# Patient Record
Sex: FEMALE | Race: WHITE | NOT HISPANIC OR LATINO
[De-identification: names, ages, dates, MRNs, and addresses within clinical notes are randomized per-mention and may not be internally consistent; named-entity substitution may affect disease eponyms.]

---

## 2017-09-11 ENCOUNTER — TRANSCRIPTION ENCOUNTER (OUTPATIENT)
Age: 82
End: 2017-09-11

## 2018-02-10 ENCOUNTER — TRANSCRIPTION ENCOUNTER (OUTPATIENT)
Age: 83
End: 2018-02-10

## 2018-03-12 ENCOUNTER — TRANSCRIPTION ENCOUNTER (OUTPATIENT)
Age: 83
End: 2018-03-12

## 2019-06-24 ENCOUNTER — INPATIENT (INPATIENT)
Facility: HOSPITAL | Age: 84
LOS: 3 days | Discharge: NURSING, REHAB OR LONG-TERM CARE | DRG: 469 | End: 2019-06-28
Attending: HOSPITALIST | Admitting: HOSPITALIST
Payer: MEDICARE

## 2019-06-24 ENCOUNTER — TRANSCRIPTION ENCOUNTER (OUTPATIENT)
Age: 84
End: 2019-06-24

## 2019-06-24 VITALS
WEIGHT: 164.91 LBS | SYSTOLIC BLOOD PRESSURE: 159 MMHG | HEART RATE: 72 BPM | RESPIRATION RATE: 20 BRPM | HEIGHT: 66 IN | DIASTOLIC BLOOD PRESSURE: 78 MMHG | OXYGEN SATURATION: 99 % | TEMPERATURE: 98 F

## 2019-06-24 DIAGNOSIS — S72.002A FRACTURE OF UNSPECIFIED PART OF NECK OF LEFT FEMUR, INITIAL ENCOUNTER FOR CLOSED FRACTURE: ICD-10-CM

## 2019-06-24 LAB
ALBUMIN SERPL ELPH-MCNC: 3.5 G/DL — SIGNIFICANT CHANGE UP (ref 3.3–5)
ALP SERPL-CCNC: 80 U/L — SIGNIFICANT CHANGE UP (ref 30–120)
ALT FLD-CCNC: 35 U/L DA — SIGNIFICANT CHANGE UP (ref 10–60)
ANION GAP SERPL CALC-SCNC: 6 MMOL/L — SIGNIFICANT CHANGE UP (ref 5–17)
APPEARANCE UR: CLEAR — SIGNIFICANT CHANGE UP
APTT BLD: 28.6 SEC — SIGNIFICANT CHANGE UP (ref 28.5–37)
APTT BLD: 29.8 SEC — SIGNIFICANT CHANGE UP (ref 28.5–37)
AST SERPL-CCNC: 33 U/L — SIGNIFICANT CHANGE UP (ref 10–40)
BASOPHILS # BLD AUTO: 0.02 K/UL — SIGNIFICANT CHANGE UP (ref 0–0.2)
BASOPHILS NFR BLD AUTO: 0.4 % — SIGNIFICANT CHANGE UP (ref 0–2)
BILIRUB SERPL-MCNC: 0.2 MG/DL — SIGNIFICANT CHANGE UP (ref 0.2–1.2)
BILIRUB UR-MCNC: NEGATIVE — SIGNIFICANT CHANGE UP
BLD GP AB SCN SERPL QL: SIGNIFICANT CHANGE UP
BUN SERPL-MCNC: 31 MG/DL — HIGH (ref 7–23)
CALCIUM SERPL-MCNC: 8.9 MG/DL — SIGNIFICANT CHANGE UP (ref 8.4–10.5)
CHLORIDE SERPL-SCNC: 103 MMOL/L — SIGNIFICANT CHANGE UP (ref 96–108)
CO2 SERPL-SCNC: 30 MMOL/L — SIGNIFICANT CHANGE UP (ref 22–31)
COLOR SPEC: SIGNIFICANT CHANGE UP
CREAT SERPL-MCNC: 0.87 MG/DL — SIGNIFICANT CHANGE UP (ref 0.5–1.3)
DIFF PNL FLD: NEGATIVE — SIGNIFICANT CHANGE UP
EOSINOPHIL # BLD AUTO: 0.21 K/UL — SIGNIFICANT CHANGE UP (ref 0–0.5)
EOSINOPHIL NFR BLD AUTO: 3.9 % — SIGNIFICANT CHANGE UP (ref 0–6)
GLUCOSE SERPL-MCNC: 108 MG/DL — HIGH (ref 70–99)
GLUCOSE UR QL: 50 MG/DL
HCT VFR BLD CALC: 34.6 % — SIGNIFICANT CHANGE UP (ref 34.5–45)
HGB BLD-MCNC: 11.3 G/DL — LOW (ref 11.5–15.5)
IMM GRANULOCYTES NFR BLD AUTO: 0.6 % — SIGNIFICANT CHANGE UP (ref 0–1.5)
INR BLD: 0.98 RATIO — SIGNIFICANT CHANGE UP (ref 0.88–1.16)
KETONES UR-MCNC: NEGATIVE — SIGNIFICANT CHANGE UP
LEUKOCYTE ESTERASE UR-ACNC: NEGATIVE — SIGNIFICANT CHANGE UP
LYMPHOCYTES # BLD AUTO: 1.04 K/UL — SIGNIFICANT CHANGE UP (ref 1–3.3)
LYMPHOCYTES # BLD AUTO: 19.5 % — SIGNIFICANT CHANGE UP (ref 13–44)
MCHC RBC-ENTMCNC: 29.6 PG — SIGNIFICANT CHANGE UP (ref 27–34)
MCHC RBC-ENTMCNC: 32.7 GM/DL — SIGNIFICANT CHANGE UP (ref 32–36)
MCV RBC AUTO: 90.6 FL — SIGNIFICANT CHANGE UP (ref 80–100)
MONOCYTES # BLD AUTO: 0.51 K/UL — SIGNIFICANT CHANGE UP (ref 0–0.9)
MONOCYTES NFR BLD AUTO: 9.6 % — SIGNIFICANT CHANGE UP (ref 2–14)
NEUTROPHILS # BLD AUTO: 3.51 K/UL — SIGNIFICANT CHANGE UP (ref 1.8–7.4)
NEUTROPHILS NFR BLD AUTO: 66 % — SIGNIFICANT CHANGE UP (ref 43–77)
NITRITE UR-MCNC: NEGATIVE — SIGNIFICANT CHANGE UP
NRBC # BLD: 0 /100 WBCS — SIGNIFICANT CHANGE UP (ref 0–0)
PH UR: 7 — SIGNIFICANT CHANGE UP (ref 5–8)
PLATELET # BLD AUTO: 221 K/UL — SIGNIFICANT CHANGE UP (ref 150–400)
POTASSIUM SERPL-MCNC: 3.5 MMOL/L — SIGNIFICANT CHANGE UP (ref 3.5–5.3)
POTASSIUM SERPL-SCNC: 3.5 MMOL/L — SIGNIFICANT CHANGE UP (ref 3.5–5.3)
PROT SERPL-MCNC: 6.7 G/DL — SIGNIFICANT CHANGE UP (ref 6–8.3)
PROT UR-MCNC: NEGATIVE MG/DL — SIGNIFICANT CHANGE UP
PROTHROM AB SERPL-ACNC: 10.7 SEC — SIGNIFICANT CHANGE UP (ref 10–12.9)
RBC # BLD: 3.82 M/UL — SIGNIFICANT CHANGE UP (ref 3.8–5.2)
RBC # FLD: 12.8 % — SIGNIFICANT CHANGE UP (ref 10.3–14.5)
SODIUM SERPL-SCNC: 139 MMOL/L — SIGNIFICANT CHANGE UP (ref 135–145)
SP GR SPEC: 1 — LOW (ref 1.01–1.02)
TSH SERPL-MCNC: 4.12 UIU/ML — SIGNIFICANT CHANGE UP (ref 0.27–4.2)
UROBILINOGEN FLD QL: NEGATIVE MG/DL — SIGNIFICANT CHANGE UP
WBC # BLD: 5.32 K/UL — SIGNIFICANT CHANGE UP (ref 3.8–10.5)
WBC # FLD AUTO: 5.32 K/UL — SIGNIFICANT CHANGE UP (ref 3.8–10.5)

## 2019-06-24 PROCEDURE — 73502 X-RAY EXAM HIP UNI 2-3 VIEWS: CPT | Mod: 26,LT

## 2019-06-24 PROCEDURE — 12345: CPT | Mod: NC

## 2019-06-24 PROCEDURE — 99285 EMERGENCY DEPT VISIT HI MDM: CPT

## 2019-06-24 PROCEDURE — 99222 1ST HOSP IP/OBS MODERATE 55: CPT | Mod: 57

## 2019-06-24 PROCEDURE — 93010 ELECTROCARDIOGRAM REPORT: CPT

## 2019-06-24 PROCEDURE — 71045 X-RAY EXAM CHEST 1 VIEW: CPT | Mod: 26

## 2019-06-24 PROCEDURE — 99223 1ST HOSP IP/OBS HIGH 75: CPT | Mod: AI

## 2019-06-24 RX ORDER — NIFEDIPINE 30 MG
30 TABLET, EXTENDED RELEASE 24 HR ORAL DAILY
Refills: 0 | Status: DISCONTINUED | OUTPATIENT
Start: 2019-06-25 | End: 2019-06-25

## 2019-06-24 RX ORDER — MORPHINE SULFATE 50 MG/1
2 CAPSULE, EXTENDED RELEASE ORAL EVERY 4 HOURS
Refills: 0 | Status: DISCONTINUED | OUTPATIENT
Start: 2019-06-24 | End: 2019-06-25

## 2019-06-24 RX ORDER — ASPIRIN/CALCIUM CARB/MAGNESIUM 324 MG
81 TABLET ORAL DAILY
Refills: 0 | Status: DISCONTINUED | OUTPATIENT
Start: 2019-06-24 | End: 2019-06-25

## 2019-06-24 RX ORDER — HEPARIN SODIUM 5000 [USP'U]/ML
5000 INJECTION INTRAVENOUS; SUBCUTANEOUS EVERY 8 HOURS
Refills: 0 | Status: COMPLETED | OUTPATIENT
Start: 2019-06-24 | End: 2019-06-24

## 2019-06-24 RX ORDER — SODIUM CHLORIDE 9 MG/ML
1000 INJECTION, SOLUTION INTRAVENOUS
Refills: 0 | Status: DISCONTINUED | OUTPATIENT
Start: 2019-06-24 | End: 2019-06-25

## 2019-06-24 RX ORDER — SERTRALINE 25 MG/1
1 TABLET, FILM COATED ORAL
Qty: 0 | Refills: 0 | DISCHARGE

## 2019-06-24 RX ORDER — TRANEXAMIC ACID 100 MG/ML
1000 INJECTION, SOLUTION INTRAVENOUS ONCE
Refills: 0 | Status: COMPLETED | OUTPATIENT
Start: 2019-06-24 | End: 2019-06-24

## 2019-06-24 RX ORDER — NIFEDIPINE 30 MG
1 TABLET, EXTENDED RELEASE 24 HR ORAL
Qty: 0 | Refills: 0 | DISCHARGE

## 2019-06-24 RX ORDER — ATORVASTATIN CALCIUM 80 MG/1
80 TABLET, FILM COATED ORAL AT BEDTIME
Refills: 0 | Status: DISCONTINUED | OUTPATIENT
Start: 2019-06-24 | End: 2019-06-25

## 2019-06-24 RX ORDER — LISINOPRIL 2.5 MG/1
20 TABLET ORAL DAILY
Refills: 0 | Status: DISCONTINUED | OUTPATIENT
Start: 2019-06-25 | End: 2019-06-25

## 2019-06-24 RX ORDER — SERTRALINE 25 MG/1
100 TABLET, FILM COATED ORAL DAILY
Refills: 0 | Status: DISCONTINUED | OUTPATIENT
Start: 2019-06-24 | End: 2019-06-25

## 2019-06-24 RX ORDER — ATENOLOL 25 MG/1
50 TABLET ORAL DAILY
Refills: 0 | Status: DISCONTINUED | OUTPATIENT
Start: 2019-06-25 | End: 2019-06-25

## 2019-06-24 RX ORDER — ATORVASTATIN CALCIUM 80 MG/1
1 TABLET, FILM COATED ORAL
Qty: 0 | Refills: 0 | DISCHARGE

## 2019-06-24 RX ORDER — MORPHINE SULFATE 50 MG/1
2 CAPSULE, EXTENDED RELEASE ORAL ONCE
Refills: 0 | Status: DISCONTINUED | OUTPATIENT
Start: 2019-06-24 | End: 2019-06-24

## 2019-06-24 RX ORDER — CEFAZOLIN SODIUM 1 G
2000 VIAL (EA) INJECTION ONCE
Refills: 0 | Status: COMPLETED | OUTPATIENT
Start: 2019-06-24 | End: 2019-06-24

## 2019-06-24 RX ORDER — ONDANSETRON 8 MG/1
4 TABLET, FILM COATED ORAL EVERY 8 HOURS
Refills: 0 | Status: DISCONTINUED | OUTPATIENT
Start: 2019-06-24 | End: 2019-06-25

## 2019-06-24 RX ADMIN — MORPHINE SULFATE 2 MILLIGRAM(S): 50 CAPSULE, EXTENDED RELEASE ORAL at 09:45

## 2019-06-24 RX ADMIN — MORPHINE SULFATE 2 MILLIGRAM(S): 50 CAPSULE, EXTENDED RELEASE ORAL at 00:56

## 2019-06-24 RX ADMIN — MORPHINE SULFATE 2 MILLIGRAM(S): 50 CAPSULE, EXTENDED RELEASE ORAL at 13:51

## 2019-06-24 RX ADMIN — MORPHINE SULFATE 2 MILLIGRAM(S): 50 CAPSULE, EXTENDED RELEASE ORAL at 13:21

## 2019-06-24 RX ADMIN — MORPHINE SULFATE 2 MILLIGRAM(S): 50 CAPSULE, EXTENDED RELEASE ORAL at 01:06

## 2019-06-24 RX ADMIN — HEPARIN SODIUM 5000 UNIT(S): 5000 INJECTION INTRAVENOUS; SUBCUTANEOUS at 22:10

## 2019-06-24 RX ADMIN — HEPARIN SODIUM 5000 UNIT(S): 5000 INJECTION INTRAVENOUS; SUBCUTANEOUS at 15:25

## 2019-06-24 RX ADMIN — Medication 81 MILLIGRAM(S): at 12:40

## 2019-06-24 RX ADMIN — MORPHINE SULFATE 2 MILLIGRAM(S): 50 CAPSULE, EXTENDED RELEASE ORAL at 09:15

## 2019-06-24 RX ADMIN — ONDANSETRON 4 MILLIGRAM(S): 8 TABLET, FILM COATED ORAL at 12:40

## 2019-06-24 RX ADMIN — ATORVASTATIN CALCIUM 80 MILLIGRAM(S): 80 TABLET, FILM COATED ORAL at 22:10

## 2019-06-24 RX ADMIN — SERTRALINE 100 MILLIGRAM(S): 25 TABLET, FILM COATED ORAL at 15:19

## 2019-06-24 RX ADMIN — SODIUM CHLORIDE 100 MILLILITER(S): 9 INJECTION, SOLUTION INTRAVENOUS at 15:19

## 2019-06-24 NOTE — PROGRESS NOTE ADULT - SUBJECTIVE AND OBJECTIVE BOX
THEE PAREDES                                                               [id number]                                                      Allergies---No Known Allergies        Pt is a 89y year old Female s/p left hip fracture following a fall yesterday.   Pt was home when she accidentally fell.   Pt's family states that she does ambulate, gingerly with a walker.   Pain is 6/10.   Currently she is NPO, but will be given food, as surgery is planned for tomorrow The patient is A&O x 3, resting comfortably in bed.   Denies any chest pain / shortness of breath / dyspnea / nausea / vomiting / headaches or light headed ness.       Vital Signs Last 24 Hrs  T(F): 98.4 (06-24-19 @ 07:05), Max: 98.4 (06-24-19 @ 07:05)  HR: 70 (06-24-19 @ 07:05)  BP: 161/64 (06-24-19 @ 07:05)  RR: 18 (06-24-19 @ 07:05)  SpO2: 92% (06-24-19 @ 07:05)    I&O's Detail    23 Jun 2019 07:01  -  24 Jun 2019 07:00  --------------------------------------------------------  IN:  Total IN: 0 mL    OUT:    Voided: 850 mL  Total OUT: 850 mL    Total NET: -850 mL        PE:   Left Lower Extremity:   Skin is clean dry and intact.   No hematoma or ecchymosis noted.   Pain and tenderness radiation into the left groin.   Neurovascularly intact.   No gross evidence of motor or sensory deficits.   EHL/FHL/TA intact.   +2 DP/PT pulses.   Toes are pink and mobile.   Negative calf tenderness.   No evidence of impending compartment syndrome.   PAS on.                             11.3   5.32  )--------------( 221                          06-24-19 @ 01:08               34.6         139   |  103  |  31  -----------------------------<  108                  06-24-19 @ 01:08  3.5    |  30    |  0.87        Ca    8.9            TPro  6.7  /  Alb  3.5  /  TBili  0.2  /  DBili  ---  /  AST  33  /  ALT  35  /  AlkPhos  80         A:   Pt is a 89y year old Female with a fracture left hip        Plan:    - Follow up with Medicine and Cardiac for clearance   - Dietary consult for food intake   - Bedrest.   NWB left lower extremity   - DVT ppx = PAS +  [id number]   - Pain control    - Incentive spirometry   - NPO after midnight   - For possible Cemented Left hip Hunter on Tuesday with Dr. Jovanna Burrows RPA-C THEE PAREDES                                                               [id number]                                                      Allergies---No Known Allergies        Pt is a 89y year old Female s/p left hip fracture following a fall yesterday.   Pt was home when she accidentally fell.   Pt's family states that she does ambulate, gingerly with a cane.   Pain is 6/10.   Currently she is NPO, but will be given food, as surgery is planned for tomorrow The patient is A&O x 3, resting comfortably in bed.   Denies any chest pain / shortness of breath / dyspnea / nausea / vomiting / headaches or light headed ness.       Vital Signs Last 24 Hrs  T(F): 98.4 (06-24-19 @ 07:05), Max: 98.4 (06-24-19 @ 07:05)  HR: 70 (06-24-19 @ 07:05)  BP: 161/64 (06-24-19 @ 07:05)  RR: 18 (06-24-19 @ 07:05)  SpO2: 92% (06-24-19 @ 07:05)    I&O's Detail    23 Jun 2019 07:01  -  24 Jun 2019 07:00  --------------------------------------------------------  IN:  Total IN: 0 mL    OUT:    Voided: 850 mL  Total OUT: 850 mL    Total NET: -850 mL        PE:   Left Lower Extremity:   Skin is clean dry and intact.   No hematoma or ecchymosis noted.   Pain and tenderness radiation into the left groin.   Neurovascularly intact.   No gross evidence of motor or sensory deficits.   EHL/FHL/TA intact.   +2 DP/PT pulses.   Toes are pink and mobile.   Negative calf tenderness.   No evidence of impending compartment syndrome.   PAS on.                             11.3   5.32  )--------------( 221                          06-24-19 @ 01:08               34.6         139   |  103  |  31  -----------------------------<  108                  06-24-19 @ 01:08  3.5    |  30    |  0.87        Ca    8.9            TPro  6.7  /  Alb  3.5  /  TBili  0.2  /  DBili  ---  /  AST  33  /  ALT  35  /  AlkPhos  80         A:   Pt is a 89y year old Female with a fracture left hip        Plan:    - Follow up with Medicine and Cardiac for clearance   - Dietary consult for food intake   - Bedrest.   NWB left lower extremity   - DVT ppx = PAS +  [id number]   - Pain control    - Incentive spirometry   - NPO after midnight   - For possible Cemented Left hip Hunter on Tuesday with Dr. Jovanna Burrows RPA-C

## 2019-06-24 NOTE — CONSULT NOTE ADULT - SUBJECTIVE AND OBJECTIVE BOX
History of Present Illness: The patient is a 89 year old female with a history of HTN, OA who presents with hip fracture. She went to go to the bathroom and had a mechanical fall. No dizziness or syncope. She denies chest pain or shortness of breath. No exertional symptoms. No prior cardiac testing.    Past Medical/Surgical History:  HTN, OA    Medications:  Home Medications:  atenolol 50 mg oral tablet: 1 tab(s) orally once a day (24 Jun 2019 01:46)  Excedrin oral tablet: 81 milligram(s) orally once a day (24 Jun 2019 01:46)  Lipitor 80 mg oral tablet: 1 tab(s) orally once a day (24 Jun 2019 01:46)  lisinopril 20 mg oral tablet: 20 milligram(s) orally 2 times a day (24 Jun 2019 01:46)  Mobic 15 mg oral tablet: 1 tab(s) orally once a day (24 Jun 2019 01:46)  NIFEdipine 30 mg oral tablet, extended release: 1 tab(s) orally once a day (24 Jun 2019 01:46)  Zoloft 100 mg oral tablet: 1 tab(s) orally once a day (24 Jun 2019 01:46)      Family History: Non-contributory family history of premature cardiovascular atherosclerotic disease    Social History: No tobacco, alcohol or drug use    Review of Systems:  General: No fevers, chills, weight loss or gain  Skin: No rashes, color changes  Cardiovascular: No chest pain, orthopnea  Respiratory: No shortness of breath, cough  Gastrointestinal: No nausea, abdominal pain  Genitourinary: No incontinence, pain with urination  Musculoskeletal: No pain, swelling, decreased range of motion  Neurological: No headache, weakness  Psychiatric: No depression, anxiety  Endocrine: No weight loss or gain, increased thirst  All other systems are comprehensively negative.    Physical Exam:  Vitals:        Vital Signs Last 24 Hrs  T(C): 36.9 (24 Jun 2019 07:05), Max: 36.9 (24 Jun 2019 07:05)  T(F): 98.4 (24 Jun 2019 07:05), Max: 98.4 (24 Jun 2019 07:05)  HR: 70 (24 Jun 2019 07:05) (70 - 72)  BP: 161/64 (24 Jun 2019 07:05) (159/78 - 161/64)  BP(mean): --  RR: 18 (24 Jun 2019 07:05) (18 - 20)  SpO2: 92% (24 Jun 2019 07:05) (92% - 99%)  General: NAD  HEENT: MMM  Neck: No JVD, no carotid bruit  Lungs: CTAB  CV: RRR, nl S1/S2, no M/R/G  Abdomen: S/NT/ND, +BS  Extremities: No LE edema, no cyanosis  Neuro: AAOx3, non-focal  Skin: No rash    Labs:                        11.3   5.32  )-----------( 221      ( 24 Jun 2019 01:08 )             34.6     06-24    139  |  103  |  31<H>  ----------------------------<  108<H>  3.5   |  30  |  0.87    Ca    8.9      24 Jun 2019 01:08    TPro  6.7  /  Alb  3.5  /  TBili  0.2  /  DBili  x   /  AST  33  /  ALT  35  /  AlkPhos  80  06-24        PT/INR - ( 24 Jun 2019 09:44 )   PT: 10.7 sec;   INR: 0.98 ratio         PTT - ( 24 Jun 2019 09:44 )  PTT:29.8 sec    ECG: NSR, LAD, 1st deg AVB, no ST abnormality

## 2019-06-24 NOTE — H&P ADULT - NSHPSOCIALHISTORY_GEN_ALL_CORE
- no smoking  + drinks wine with dinner  - no illegal drug use  - lives with family  - walks with a cane

## 2019-06-24 NOTE — H&P ADULT - HISTORY OF PRESENT ILLNESS
89F with depression, HTN, HLD who presents with left hip pain after a fall.  Per daughter, patient was in her recliner and went to get up and possibly slipped and fell.  Unwitnessed fall but daughter did hear a "thud."  Patient was found on her stomach on the ground.  Patient said she might have slipped.  She denied any dizziness, lost of consciousness, headaches.  No head trauma.  Denied any chest pain, SOB, palpitations.  Patient was brought here for further evaluation.  In the ED, patient was found to have a left hip fracture and is being admitted for possible surgery.  Prior to the fall, patient had one episode of diarrhea this evening but otherwise has had no other illnesses.  No recent fevers, cough, vomiting.  Patient currently has pain in the left hip and feels a bit nauseous.

## 2019-06-24 NOTE — DIETITIAN INITIAL EVALUATION ADULT. - ADHERENCE
n/a/pt on a regular diet; per pt's daughter, pt typically eats healthy but avoids excessive fiber- in particular, pt avoids salads, drinks lactaid milk

## 2019-06-24 NOTE — H&P ADULT - NSHPPHYSICALEXAM_GEN_ALL_CORE
PHYSICAL EXAM:  Vital Signs Last 24 Hrs  T(C): 36.7 (24 Jun 2019 02:11), Max: 36.7 (24 Jun 2019 00:35)  T(F): 98 (24 Jun 2019 02:11), Max: 98.1 (24 Jun 2019 00:35)  HR: 70 (24 Jun 2019 02:11) (70 - 72)  BP: 160/70 (24 Jun 2019 02:11) (159/78 - 160/70)  BP(mean): --  RR: 18 (24 Jun 2019 02:11) (18 - 20)  SpO2: 99% (24 Jun 2019 02:11) (99% - 99%)    GENERAL:     NAD, well-groomed, well-developed  HEAD:     atraumatic, normocephalic  EYES:     EOMI, conjunctiva and sclera clear  ENMT:     no tonsillar erythema or exudates or enlargement, no oral lesions, dry mucous membranes, good dentition  NECK:     supple, no JVD  RESPIRATORY:     clear to auscultation bilaterally, no rales or rhonchi or wheezing or rubs  CARDIOVASCULAR:     soft s1 and s2, regular rate and rhythm, i/vi BEN LLSB, 2+ peripheral pulses  GASTROINTESTINAL:     soft, nontender, nondistended, no hepatosplenomegaly palpated, bowel sounds present  EXTREMITIES:     +left leg slight externally rotated, no edema  MUSCULOSKELETAL:     +left hip pain  NERVOUS SYSTEM:     left leg ROM limited 2/2 pain, all other extremities intact, no loss of sensory  SKIN:     no rashes or lesions  PSYCH:     appropriate, alert and orientated x3, good concentration

## 2019-06-24 NOTE — DIETITIAN INITIAL EVALUATION ADULT. - NS AS NUTRI INTERV MEALS SNACK
Other (specify)/General/healthful diet/Fat - modified diet/Pt has hx of HTN, HLD- may benefit from DASH/TLC diet; however d/t pt's advanced age & inadequate oral intake at this time, regular diet appropriate. BRAT diet preferences added to cardex/[place] - modified diet

## 2019-06-24 NOTE — ED PROVIDER NOTE - CLINICAL SUMMARY MEDICAL DECISION MAKING FREE TEXT BOX
Patient with hip pain after fall. Exam suggests fracture. Will get xray and labs. Patient will require admission

## 2019-06-24 NOTE — CONSULT NOTE ADULT - ASSESSMENT
The patient is a 89 year old female with a history of HTN, OA who presents with hip fracture.    Plan:  - ECG with no evidence of ischemia or infarction  - Continue atenolol 50 mg daily  - Continue lisinopril 20 mg daily  - Continue nifedipine 30 mg daily  - The patient is asymptomatic from a cardiac perspective and there are no active cardiac issues. The patient is at intermediate risk for cardiac events for an intermediate risk surgery. She is optimized to proceed without additional cardiac testing.

## 2019-06-24 NOTE — H&P ADULT - NSHPLABSRESULTS_GEN_ALL_CORE
LABS:                        11.3<L>  5.32  )-----------( 221      ( 24 Jun 2019 01:08 )             34.6     139    |  103    |  31<H>  ----------------------------<  108<H>    24 Jun 2019 01:08  3.5     |  30     |  0.87     Ca 8.9           24 Jun 2019 01:08    TPro  6.7    /  Alb  3.5    /  TBili  0.2    /  DBili  x      /  AST  33     /  ALT  35     /  AlkPhos  80     24 Jun 2019 01:08    EKG:  NSR with 1st degree AV block (ND 250ms), LAD, q wave in V1-V2  Radiology:  CXR - prelim read by me - grossly clear lungs  Hip xray - pending results, possible left femoral head fracture

## 2019-06-24 NOTE — ED ADULT NURSE NOTE - OBJECTIVE STATEMENT
she slipped and fell, most likely from her slippers as per ems, l hip pain she slipped and fell, most likely from her slippers as per ems, l hip pain, l leg short externally rotated

## 2019-06-24 NOTE — H&P ADULT - ASSESSMENT
89F with depression, HTN, HLD who presents with left hip pain after a fall.  Found to have possible left femoral fracture.      Left femoral fracture  - admitted to medicine  - ortho consulted already by ED  - regarding pre-op clearance, patient has not   - keep NPO 89F with depression, HTN, HLD who presents with left hip pain after a fall.  Found to have possible left femoral fracture.      Left femoral fracture  - admitted to medicine  - ortho consulted already by ED  - regarding pre-op clearance, patient does not have that many cardiac risk factors.  Has HTN and HLD but otherwise no other known cardiac disease.  Denies any cardiac symptoms such as chest pain, SOB.  Patient has a score of RCRI 0 and therefore is in class I risk.  Based on GSCRI, has a 0.3% cardiac risk.  Therefore, patient can proceed to surgery without any cardiovascular testing.    - keep NPO  - pain control  - anti-emetics  - PT/OT  - DVT ppx (SCD for now)    HTN/HLD  - patient's medication list state that she takes lisinopril, atenolol, and nifedipine BID, which is an usual dosing regiment  - will start them as once a day and need to clarify with PMD if it was meant to be BID or daily  - cont atenolol today  - cont lisinopril and nifedpine tomororw  - cont with atorvastatin  - cont with ASA 81mg    Depression  - cont with zoloft    Preventive measure  IMPROVE VTE Individual Risk Assessment          RISK                                                          Points  [  ] Previous VTE                                                 3  [  ] Thrombophilia                                              2  [  ] Lower limb paralysis                                    2        (unable to hold up >15 seconds)    [  ] Current Cancer                                             2         (within 6 months)  [  ] Immobilization > 24 hrs                              1  [  ] ICU/CCU stay > 24 hours                            1  [X] Age > 60                                                        1    IMPROVE VTE Score 1  - holding AC 2/2 pending surgery  - SCD for now

## 2019-06-24 NOTE — H&P ADULT - NSHPREVIEWOFSYSTEMS_GEN_ALL_CORE
REVIEW OF SYSTEMS:  CONSTITUTIONAL:    no fever or weight loss or fatigue  EYES:    no eye pain or visual disturbances or discharge  ENMT:     +hard of hearing, no tinnitus or vertigo, no sinus or throat pain  NECK:    no pain or stiffness  RESPIRATORY:    no cough or wheezing or chills or hemoptysis, no shortness of breath  CARDIOVASCULAR:    no chest pain or palpitations or dizziness or leg swelling  GASTROINTESTINAL:    +nausea, no abdominal or epigastric pain, no vomiting or hematemesis, +diarrhea, no constipation. no melena or hematochezia.  GENITOURINARY:    no dysuria or frequency or hematuria or incontinence  NEUROLOGICAL:    no headaches or memory loss or loss of strength or numbness or tremors  SKIN:    no itching or burning or rashes or lesions   LYMPH NODES:    no enlarged glands  ENDOCRINE:    no heat or cold intolerance, no hair loss, no polydipsia or polyuria  MUSCULOSKELETAL:    +left hip pain, no muscle or back or extremity pain  PSYCHIATRIC:    no depression or anxiety or mood swings or difficulty sleeping  HEME/LYMPH:    no easy bruising or bleeding gums  ALLERGY AND IMMUNOLOGIC:    no hives or eczema

## 2019-06-24 NOTE — DIETITIAN INITIAL EVALUATION ADULT. - ORAL INTAKE PTA
per pt's daughter, pt typically has a good appetite. Lives w/ adult children & adult grandchildren who cook; pt typically eats 2-3 meals/day/good

## 2019-06-24 NOTE — ED PROVIDER NOTE - OBJECTIVE STATEMENT
Patient slipped and fell at home tonight. Landed on left side and hurt her left hip. Denies any other injury. No head injury, no LOC, no neck pain.

## 2019-06-24 NOTE — ED ADULT NURSE NOTE - NSIMPLEMENTINTERV_GEN_ALL_ED
Implemented All Fall with Harm Risk Interventions:  [place] to call system. Call bell, personal items and telephone within reach. Instruct patient to call for assistance. Room bathroom lighting operational. Non-slip footwear when patient is off stretcher. Physically safe environment: no spills, clutter or unnecessary equipment. Stretcher in lowest position, wheels locked, appropriate side rails in place. Provide visual cue, wrist band, yellow gown, etc. Monitor gait and stability. Monitor for mental status changes and reorient to person, place, and time. Review medications for side effects contributing to fall risk. Reinforce activity limits and safety measures with patient and family. Provide visual clues: red socks.

## 2019-06-24 NOTE — DIETITIAN INITIAL EVALUATION ADULT. - OTHER INFO
89 y/o F s/p L hip fracture following a fall. Pt was NPO, was supposed to have surgery today but reports diarrhea yesterday. Per pt/pt's family, diarrhea has now resolved. Pt now on a regular diet. Per pt's family, pt follows a "BRAT" diet (bananas, rice applesauce, tea/toast) when having episodes of diarrhea. Pt also drinks lactaid milk and avoids salad and excessive fiber. Will honor food requests to prevent further GI distress. PTA, pt had a good appetite; lives w/ adult children/adult grandchildren. No weight changes reported per pt/pt's family. Skin intact of PU, Yfn Score 16. No edema noted per flowsheets.

## 2019-06-25 ENCOUNTER — RESULT REVIEW (OUTPATIENT)
Age: 84
End: 2019-06-25

## 2019-06-25 PROCEDURE — 70450 CT HEAD/BRAIN W/O DYE: CPT | Mod: 26

## 2019-06-25 PROCEDURE — 88311 DECALCIFY TISSUE: CPT | Mod: 26

## 2019-06-25 PROCEDURE — 99233 SBSQ HOSP IP/OBS HIGH 50: CPT

## 2019-06-25 PROCEDURE — 27236 TREAT THIGH FRACTURE: CPT | Mod: LT

## 2019-06-25 PROCEDURE — 73501 X-RAY EXAM HIP UNI 1 VIEW: CPT | Mod: 26,LT

## 2019-06-25 PROCEDURE — 88305 TISSUE EXAM BY PATHOLOGIST: CPT | Mod: 26

## 2019-06-25 RX ORDER — ENOXAPARIN SODIUM 100 MG/ML
40 INJECTION SUBCUTANEOUS EVERY 24 HOURS
Refills: 0 | Status: DISCONTINUED | OUTPATIENT
Start: 2019-06-26 | End: 2019-06-28

## 2019-06-25 RX ORDER — ACETAMINOPHEN 500 MG
650 TABLET ORAL EVERY 6 HOURS
Refills: 0 | Status: DISCONTINUED | OUTPATIENT
Start: 2019-06-25 | End: 2019-06-26

## 2019-06-25 RX ORDER — MORPHINE SULFATE 50 MG/1
3 CAPSULE, EXTENDED RELEASE ORAL EVERY 4 HOURS
Refills: 0 | Status: DISCONTINUED | OUTPATIENT
Start: 2019-06-25 | End: 2019-06-26

## 2019-06-25 RX ORDER — ONDANSETRON 8 MG/1
4 TABLET, FILM COATED ORAL ONCE
Refills: 0 | Status: DISCONTINUED | OUTPATIENT
Start: 2019-06-25 | End: 2019-06-25

## 2019-06-25 RX ORDER — OXYCODONE HYDROCHLORIDE 5 MG/1
5 TABLET ORAL EVERY 4 HOURS
Refills: 0 | Status: DISCONTINUED | OUTPATIENT
Start: 2019-06-25 | End: 2019-06-28

## 2019-06-25 RX ORDER — SODIUM CHLORIDE 9 MG/ML
1000 INJECTION, SOLUTION INTRAVENOUS
Refills: 0 | Status: DISCONTINUED | OUTPATIENT
Start: 2019-06-25 | End: 2019-06-25

## 2019-06-25 RX ORDER — MAGNESIUM HYDROXIDE 400 MG/1
30 TABLET, CHEWABLE ORAL DAILY
Refills: 0 | Status: DISCONTINUED | OUTPATIENT
Start: 2019-06-25 | End: 2019-06-25

## 2019-06-25 RX ORDER — ATORVASTATIN CALCIUM 80 MG/1
80 TABLET, FILM COATED ORAL AT BEDTIME
Refills: 0 | Status: DISCONTINUED | OUTPATIENT
Start: 2019-06-25 | End: 2019-06-28

## 2019-06-25 RX ORDER — MAGNESIUM HYDROXIDE 400 MG/1
30 TABLET, CHEWABLE ORAL DAILY
Refills: 0 | Status: DISCONTINUED | OUTPATIENT
Start: 2019-06-25 | End: 2019-06-28

## 2019-06-25 RX ORDER — OLANZAPINE 15 MG/1
2.5 TABLET, FILM COATED ORAL ONCE
Refills: 0 | Status: DISCONTINUED | OUTPATIENT
Start: 2019-06-25 | End: 2019-06-28

## 2019-06-25 RX ORDER — HYDROMORPHONE HYDROCHLORIDE 2 MG/ML
0.5 INJECTION INTRAMUSCULAR; INTRAVENOUS; SUBCUTANEOUS
Refills: 0 | Status: DISCONTINUED | OUTPATIENT
Start: 2019-06-25 | End: 2019-06-25

## 2019-06-25 RX ORDER — DOCUSATE SODIUM 100 MG
100 CAPSULE ORAL THREE TIMES A DAY
Refills: 0 | Status: DISCONTINUED | OUTPATIENT
Start: 2019-06-25 | End: 2019-06-25

## 2019-06-25 RX ORDER — POLYETHYLENE GLYCOL 3350 17 G/17G
17 POWDER, FOR SOLUTION ORAL DAILY
Refills: 0 | Status: DISCONTINUED | OUTPATIENT
Start: 2019-06-25 | End: 2019-06-28

## 2019-06-25 RX ORDER — ONDANSETRON 8 MG/1
4 TABLET, FILM COATED ORAL EVERY 6 HOURS
Refills: 0 | Status: DISCONTINUED | OUTPATIENT
Start: 2019-06-25 | End: 2019-06-28

## 2019-06-25 RX ORDER — ATENOLOL 25 MG/1
50 TABLET ORAL DAILY
Refills: 0 | Status: DISCONTINUED | OUTPATIENT
Start: 2019-06-25 | End: 2019-06-28

## 2019-06-25 RX ORDER — SODIUM CHLORIDE 9 MG/ML
1000 INJECTION, SOLUTION INTRAVENOUS
Refills: 0 | Status: DISCONTINUED | OUTPATIENT
Start: 2019-06-25 | End: 2019-06-28

## 2019-06-25 RX ORDER — CEFAZOLIN SODIUM 1 G
2000 VIAL (EA) INJECTION EVERY 8 HOURS
Refills: 0 | Status: COMPLETED | OUTPATIENT
Start: 2019-06-26 | End: 2019-06-26

## 2019-06-25 RX ORDER — LISINOPRIL 2.5 MG/1
20 TABLET ORAL DAILY
Refills: 0 | Status: DISCONTINUED | OUTPATIENT
Start: 2019-06-27 | End: 2019-06-28

## 2019-06-25 RX ORDER — SENNA PLUS 8.6 MG/1
2 TABLET ORAL AT BEDTIME
Refills: 0 | Status: DISCONTINUED | OUTPATIENT
Start: 2019-06-25 | End: 2019-06-28

## 2019-06-25 RX ORDER — SERTRALINE 25 MG/1
100 TABLET, FILM COATED ORAL DAILY
Refills: 0 | Status: DISCONTINUED | OUTPATIENT
Start: 2019-06-25 | End: 2019-06-28

## 2019-06-25 RX ORDER — PANTOPRAZOLE SODIUM 20 MG/1
40 TABLET, DELAYED RELEASE ORAL
Refills: 0 | Status: DISCONTINUED | OUTPATIENT
Start: 2019-06-25 | End: 2019-06-28

## 2019-06-25 RX ORDER — NIFEDIPINE 30 MG
30 TABLET, EXTENDED RELEASE 24 HR ORAL DAILY
Refills: 0 | Status: DISCONTINUED | OUTPATIENT
Start: 2019-06-25 | End: 2019-06-28

## 2019-06-25 RX ORDER — DOCUSATE SODIUM 100 MG
100 CAPSULE ORAL THREE TIMES A DAY
Refills: 0 | Status: DISCONTINUED | OUTPATIENT
Start: 2019-06-25 | End: 2019-06-28

## 2019-06-25 RX ADMIN — HYDROMORPHONE HYDROCHLORIDE 0.5 MILLIGRAM(S): 2 INJECTION INTRAMUSCULAR; INTRAVENOUS; SUBCUTANEOUS at 19:10

## 2019-06-25 RX ADMIN — LISINOPRIL 20 MILLIGRAM(S): 2.5 TABLET ORAL at 05:16

## 2019-06-25 RX ADMIN — SODIUM CHLORIDE 50 MILLILITER(S): 9 INJECTION, SOLUTION INTRAVENOUS at 00:00

## 2019-06-25 RX ADMIN — MORPHINE SULFATE 3 MILLIGRAM(S): 50 CAPSULE, EXTENDED RELEASE ORAL at 21:44

## 2019-06-25 RX ADMIN — MORPHINE SULFATE 2 MILLIGRAM(S): 50 CAPSULE, EXTENDED RELEASE ORAL at 12:15

## 2019-06-25 RX ADMIN — HYDROMORPHONE HYDROCHLORIDE 0.5 MILLIGRAM(S): 2 INJECTION INTRAMUSCULAR; INTRAVENOUS; SUBCUTANEOUS at 19:24

## 2019-06-25 RX ADMIN — SODIUM CHLORIDE 100 MILLILITER(S): 9 INJECTION, SOLUTION INTRAVENOUS at 20:03

## 2019-06-25 RX ADMIN — ATENOLOL 50 MILLIGRAM(S): 25 TABLET ORAL at 05:16

## 2019-06-25 RX ADMIN — MORPHINE SULFATE 2 MILLIGRAM(S): 50 CAPSULE, EXTENDED RELEASE ORAL at 12:30

## 2019-06-25 RX ADMIN — HYDROMORPHONE HYDROCHLORIDE 0.5 MILLIGRAM(S): 2 INJECTION INTRAMUSCULAR; INTRAVENOUS; SUBCUTANEOUS at 20:02

## 2019-06-25 RX ADMIN — Medication 30 MILLIGRAM(S): at 05:16

## 2019-06-25 RX ADMIN — MORPHINE SULFATE 3 MILLIGRAM(S): 50 CAPSULE, EXTENDED RELEASE ORAL at 22:00

## 2019-06-25 NOTE — CONSULT NOTE ADULT - SUBJECTIVE AND OBJECTIVE BOX
Patient is a 89y old  Female who presents with a chief complaint of left hip pain (2019 11:40)      HPI: 89F with depression, HTN, HLD who presents with left hip pain after a fall.  Per daughter, patient was in her recliner and went to get up and possibly slipped and fell.  Unwitnessed fall but daughter did hear a "thud."  Patient was found on her stomach on the ground.  Patient said she might have slipped.  She denied any dizziness, lost of consciousness, headaches.  No head trauma.  Denied any chest pain, SOB, palpitations.  Patient was brought here for further evaluation.  In the ED, patient was found to have a left hip fracture and is being admitted for possible surgery.  Prior to the fall, patient had one episode of diarrhea this evening but otherwise has had no other illnesses.  No recent fevers, cough, vomiting.  Patient currently has pain in the left hip and feels a bit nauseous. (2019 02:05)      PAST MEDICAL & SURGICAL HISTORY:  Macular degeneration  Anxiety  Hypertension  No significant past surgical history    MEDICATIONS  (STANDING):  aspirin enteric coated 81 milliGRAM(s) Oral daily  ATENolol  Tablet 50 milliGRAM(s) Oral daily  atorvastatin 80 milliGRAM(s) Oral at bedtime  lactated ringers. 1000 milliLiter(s) (50 mL/Hr) IV Continuous <Continuous>  lactated ringers. 1000 milliLiter(s) (100 mL/Hr) IV Continuous <Continuous>  lisinopril 20 milliGRAM(s) Oral daily  NIFEdipine XL 30 milliGRAM(s) Oral daily  sertraline 100 milliGRAM(s) Oral daily    MEDICATIONS  (PRN):  morphine  - Injectable 2 milliGRAM(s) IV Push every 4 hours PRN Moderate Pain (4 - 6)  ondansetron Injectable 4 milliGRAM(s) IV Push every 8 hours PRN Nausea and/or Vomiting    Allergies    No Known Allergies    SOCIAL HISTORY:    Ex Smoker. Quite 50 yrs ago. Only smoked for few yrs  No h/o use of alcohol    FAMILY HISTORY:  FH: heart disease: father - family is not clear as to what it was specifically      REVIEW OF SYSTEMS:    CONSTITUTIONAL: No fever  EYES: No eye pain,   ENMT:  No sinus or throat pain  NECK: No pain or stiffness  RESPIRATORY: No cough, No hemoptysis; No shortness of breath  CARDIOVASCULAR: No acute chest pain, palpitations,  or leg swelling  GASTROINTESTINAL: No abdominal pain. No nausea, vomiting, or hematemesis;  No melena or hematochezia.  GENITOURINARY: No  hematuria, or incontinence  MUSCULOSKELETAL: deformed joints sec to severe arthritis. S/op left Hip Fx.   SKIN: No itching, rashes, or lesions   LYMPH NODES: No enlarged glands  NEUROLOGICAL: No headaches, h/o memory loss,   PSYCHIATRIC: No depression, anxiety, mood swings, or difficulty sleeping  ENDOCRINE: No heat or cold intolerance;   HEME/LYMPH: No easy bruising, or bleeding gums  Allergy/Immunology. No medication allergy. No seasonal allergies.    PHYSICAL EXAM:  Vital Signs Last 24 Hrs  T(F): 98.4 (19 @ 07:34)  HR: 76 (19 @ 07:34)  BP: 143/68 (19 @ 07:34)  RR: 17 (19 @ 07:34)    GENERAL: NAD, well-groomed, well-developed  HEAD:  Atraumatic, Normocephalic  EYES: EOMI, PERRLA, conjunctiva and sclera clear  NECK: Supple, No JVD, thyroid non-palpable    On Neurological Examination:    Mental Status - Pt is alert, awake, Poor cognition. Follows simple commands.    Speech -  Normal. Pt has no aphasia.    Cranial Nerves - Pupils 3 mm equal and reactive to light.  Pt has no facial asymmetry. Tongue - is in midline.    Motor Exam - Moves all extremities equally except left LE which has ACUTE left Hip Fx. .     Sensory Exam - Pt withdraws all extremities equally on stimulation except left LE, has left Hip Fx.     Gait - Can't be tested sec to left Hip Fx.    Deep tendon Reflexes - 2 plus all over.    Coordination - Unable to cooperate fully.     Neck Supple -  Yes.    LABS:                        11.3   5.32  )-----------( 221      ( 2019 01:08 )             34.6     06-    139  |  103  |  31<H>  ----------------------------<  108<H>  3.5   |  30  |  0.87    Ca    8.9      2019 01:08    TPro  6.7  /  Alb  3.5  /  TBili  0.2  /  DBili  x   /  AST  33  /  ALT  35  /  AlkPhos  80  -    PT/INR - ( 2019 09:44 )   PT: 10.7 sec;   INR: 0.98 ratio         PTT - ( 2019 09:44 )  PTT:29.8 sec  Urinalysis Basic - ( 2019 07:56 )    Color: Pale Yellow / Appearance: Clear / S.005 / pH: x  Gluc: x / Ketone: Negative  / Bili: Negative / Urobili: Negative mg/dL   Blood: x / Protein: Negative mg/dL / Nitrite: Negative   Leuk Esterase: Negative / RBC: x / WBC x   Sq Epi: x / Non Sq Epi: x / Bacteria: x    RADIOLOGY & ADDITIONAL STUDIES:    < from: CT Head No Cont (19 @ 11:47) >  IMPRESSION: No acute intracranial hemorrhage, mass effect, or shift of   the midline structures.    < end of copied text >

## 2019-06-25 NOTE — PROGRESS NOTE ADULT - SUBJECTIVE AND OBJECTIVE BOX
Patient is a 89y old  Female who presents with a chief complaint of left hip pain (2019 09:38)      INTERVAL HPI/OVERNIGHT EVENTS:  Pt is seen and examined.  c/o pain in hip at times. seems confused and forgetful intermittently.    Pain Location & Control:     MEDICATIONS  (STANDING):  aspirin enteric coated 81 milliGRAM(s) Oral daily  ATENolol  Tablet 50 milliGRAM(s) Oral daily  atorvastatin 80 milliGRAM(s) Oral at bedtime  lactated ringers. 1000 milliLiter(s) (50 mL/Hr) IV Continuous <Continuous>  lactated ringers. 1000 milliLiter(s) (100 mL/Hr) IV Continuous <Continuous>  lisinopril 20 milliGRAM(s) Oral daily  NIFEdipine XL 30 milliGRAM(s) Oral daily  sertraline 100 milliGRAM(s) Oral daily    MEDICATIONS  (PRN):  morphine  - Injectable 2 milliGRAM(s) IV Push every 4 hours PRN Moderate Pain (4 - 6)  ondansetron Injectable 4 milliGRAM(s) IV Push every 8 hours PRN Nausea and/or Vomiting      Allergies    No Known Allergies    Intolerances      Vital Signs Last 24 Hrs  T(C): 36.9 (2019 07:34), Max: 37.2 (2019 23:30)  T(F): 98.4 (2019 07:34), Max: 99 (2019 23:30)  HR: 76 (2019 07:34) (67 - 76)  BP: 143/68 (2019 07:34) (143/68 - 164/70)  BP(mean): --  RR: 17 (2019 07:34) (17 - 18)  SpO2: 93% (2019 07:34) (91% - 94%)        LABS:      Ca    8.9        2019 01:08      PT/INR - ( 2019 09:44 )   PT: 10.7 sec;   INR: 0.98 ratio         PTT - ( 2019 09:44 )  PTT:29.8 sec  Urinalysis Basic - ( 2019 07:56 )    Color: Pale Yellow / Appearance: Clear / S.005 / pH: x  Gluc: x / Ketone: Negative  / Bili: Negative / Urobili: Negative mg/dL   Blood: x / Protein: Negative mg/dL / Nitrite: Negative   Leuk Esterase: Negative / RBC: x / WBC x   Sq Epi: x / Non Sq Epi: x / Bacteria: x        RADIOLOGY & ADDITIONAL TESTS:    Imaging Personally Reviewed:  [ ] YES  [ ] NO    Consultant(s) Notes Reviewed:  [ ] YES  [ ] NO    Care Discussed with Consultants/Other Providers [x ] YES  [ ] NO

## 2019-06-25 NOTE — CONSULT NOTE ADULT - ASSESSMENT
Seen for AMS/confusion.  H/o cognitive impairment.  S/o Fall/Acute Left Hip Fx.  No Head Injury or seizure reported.  No signs of CVA  CT Head - No acute pathology.  Volume depletion.  Received IV Fluids.  Neurologically pt is CLEARED for left Hip Sx.  D/w Pt's 2 daughters at bedside.  Questions answered.  Pain meds.  Fall/safety precautions.  Would continue to follow.

## 2019-06-25 NOTE — PROGRESS NOTE ADULT - SUBJECTIVE AND OBJECTIVE BOX
Chief Complaint: Fall    Interval Events: Confused and agitated this morning.    Review of Systems:  General: No fevers, chills, weight loss or gain  Skin: No rashes, color changes  Cardiovascular: No chest pain, orthopnea  Respiratory: No shortness of breath, cough  Gastrointestinal: No nausea, abdominal pain  Genitourinary: No incontinence, pain with urination  Musculoskeletal: No pain, swelling, decreased range of motion  Neurological: No headache, weakness  Psychiatric: No depression, anxiety  Endocrine: No weight loss or gain, increased thirst  All other systems are comprehensively negative.    Physical Exam:  Vitals:        Vital Signs Last 24 Hrs  T(C): 36.9 (25 Jun 2019 07:34), Max: 37.2 (24 Jun 2019 23:30)  T(F): 98.4 (25 Jun 2019 07:34), Max: 99 (24 Jun 2019 23:30)  HR: 76 (25 Jun 2019 07:34) (67 - 76)  BP: 143/68 (25 Jun 2019 07:34) (143/68 - 164/70)  BP(mean): --  RR: 17 (25 Jun 2019 07:34) (17 - 18)  SpO2: 93% (25 Jun 2019 07:34) (91% - 94%)  General: NAD  HEENT: MMM  Neck: No JVD, no carotid bruit  Lungs: CTAB  CV: RRR, nl S1/S2, no M/R/G  Abdomen: S/NT/ND, +BS  Extremities: No LE edema, no cyanosis  Neuro: AAOx3, non-focal  Skin: No rash    Labs:                        11.3   5.32  )-----------( 221      ( 24 Jun 2019 01:08 )             34.6     06-24    139  |  103  |  31<H>  ----------------------------<  108<H>  3.5   |  30  |  0.87    Ca    8.9      24 Jun 2019 01:08    TPro  6.7  /  Alb  3.5  /  TBili  0.2  /  DBili  x   /  AST  33  /  ALT  35  /  AlkPhos  80  06-24        PT/INR - ( 24 Jun 2019 09:44 )   PT: 10.7 sec;   INR: 0.98 ratio         PTT - ( 24 Jun 2019 09:44 )  PTT:29.8 sec

## 2019-06-25 NOTE — PROGRESS NOTE ADULT - MENTAL STATUS
awake but confused and forgetful intermittently.  moving all extremity except left Lower extremity due to hip fracture.

## 2019-06-25 NOTE — PROGRESS NOTE ADULT - ASSESSMENT
89F with depression, HTN, HLD who presents with left hip pain after a fall.  Found to have possible left femoral fracture.      Left femoral fracture  - admitted to medicine  - ortho consulted already by ED  - regarding pre-op clearance, patient does not have that many cardiac risk factors.  Has HTN and HLD but otherwise no other known cardiac disease.  Denies any cardiac symptoms such as chest pain, SOB.  Patient has a score of RCRI 0 and therefore is in class I risk.  Based on GSCRI, has a 0.3% cardiac risk.  Therefore, patient can proceed to surgery without any cardiovascular testing.    - keep NPO  - pain control  - anti-emetics  - PT/OT  - DVT ppx (SCD for now)    HTN/HLD  - patient's medication list state that she takes lisinopril, atenolol, and nifedipine BID, which is an usual dosing regiment  - will start them as once a day and need to clarify with PMD if it was meant to be BID or daily  - cont atenolol today  - cont lisinopril and nifedpine tomororw  - cont with atorvastatin  - cont with ASA 81mg    Depression  - cont with zoloft    Altered mental status  -likly due to meds and worsening of dementia.  -f/u CT head stat.  -Neurology consult Dr.Kishore Carlos (notified).      Preventive measure  IMPROVE VTE Individual Risk Assessment          RISK                                                          Points  [  ] Previous VTE                                                 3  [  ] Thrombophilia                                              2  [  ] Lower limb paralysis                                    2        (unable to hold up >15 seconds)    [  ] Current Cancer                                             2         (within 6 months)  [  ] Immobilization > 24 hrs                              1  [  ] ICU/CCU stay > 24 hours                            1  [X] Age > 60                                                        1    IMPROVE VTE Score 1  - holding AC 2/2 pending surgery  - SCD for now    Plan of care was discuss with patient's family at bedside, all questions were answered, seems understand and in agreement. 89F with depression, HTN, HLD who presents with left hip pain after a fall.  Found to have possible left femoral fracture.      Left femoral fracture  - admitted to medicine  - ortho consulted already by ED  - regarding pre-op clearance, patient does not have that many cardiac risk factors.  Has HTN and HLD but otherwise no other known cardiac disease.  Denies any cardiac symptoms such as chest pain, SOB.  Patient has a score of RCRI 0 and therefore is in class I risk.  Based on GSCRI, has a 0.3% cardiac risk.  Therefore, patient can proceed to surgery without any cardiovascular testing.    - keep NPO  - pain control  - anti-emetics  - PT/OT  - DVT ppx (SCD for now)    HTN/HLD  - patient's medication list state that she takes lisinopril, atenolol, and nifedipine BID, which is an usual dosing regiment  - will start them as once a day and need to clarify with PMD if it was meant to be BID or daily  - cont atenolol today  - cont lisinopril and nifedpine tomororw  - cont with atorvastatin  - cont with ASA 81mg    Depression  - cont with zoloft    Altered mental status  -likly due to meds and worsening of dementia.  -f/u CT head stat.  -Neurology consult Dr.Kishore Carlos (notified).      Preventive measure  IMPROVE VTE Individual Risk Assessment          RISK                                                          Points  [  ] Previous VTE                                                 3  [  ] Thrombophilia                                              2  [  ] Lower limb paralysis                                    2        (unable to hold up >15 seconds)    [  ] Current Cancer                                             2         (within 6 months)  [  ] Immobilization > 24 hrs                              1  [  ] ICU/CCU stay > 24 hours                            1  [X] Age > 60                                                        1    IMPROVE VTE Score 1  - holding AC 2/2 pending surgery  - SCD for now    Plan of care was discuss with patient's family at bedside, all questions were answered, seems understand and in agreement.    Patient is medically optimized for surgery with intermediate risk pending neurology clearance. 89F with depression, HTN, HLD who presents with left hip pain after a fall.  Found to have possible left femoral fracture.      Left femoral fracture  - admitted to medicine  - ortho consulted already by ED  - regarding pre-op clearance, patient does not have that many cardiac risk factors.  Has HTN and HLD but otherwise no other known cardiac disease.  Denies any cardiac symptoms such as chest pain, SOB.  Patient has a score of RCRI 0 and therefore is in class I risk.  Based on GSCRI, has a 0.3% cardiac risk.  Therefore, patient can proceed to surgery without any cardiovascular testing.    - keep NPO  - pain control  - anti-emetics  - PT/OT  - DVT ppx (SCD for now)    HTN/HLD  - patient's medication list state that she takes lisinopril, atenolol, and nifedipine BID, which is an usual dosing regiment  - will start them as once a day and need to clarify with PMD if it was meant to be BID or daily  - cont atenolol today  - cont lisinopril and nifedpine tomororw  - cont with atorvastatin  - cont with ASA 81mg    Depression  - cont with zoloft    Altered mental status  -likly due to meds and worsening of dementia.  -f/u CT head stat.  -Neurology consult Dr.Kishore Carlos (notified).      Preventive measure  IMPROVE VTE Individual Risk Assessment          RISK                                                          Points  [  ] Previous VTE                                                 3  [  ] Thrombophilia                                              2  [  ] Lower limb paralysis                                    2        (unable to hold up >15 seconds)    [  ] Current Cancer                                             2         (within 6 months)  [  ] Immobilization > 24 hrs                              1  [  ] ICU/CCU stay > 24 hours                            1  [X] Age > 60                                                        1    IMPROVE VTE Score 1  - holding AC 2/2 pending surgery  - SCD for now    Plan of care was discuss with patient's family at bedside, all questions were answered, seems understand and in agreement.    Patient is medically optimized for surgery with intermediate risk.

## 2019-06-26 ENCOUNTER — TRANSCRIPTION ENCOUNTER (OUTPATIENT)
Age: 84
End: 2019-06-26

## 2019-06-26 LAB
ANION GAP SERPL CALC-SCNC: 5 MMOL/L — SIGNIFICANT CHANGE UP (ref 5–17)
BASOPHILS # BLD AUTO: 0 K/UL — SIGNIFICANT CHANGE UP (ref 0–0.2)
BASOPHILS NFR BLD AUTO: 0 % — SIGNIFICANT CHANGE UP (ref 0–2)
BUN SERPL-MCNC: 18 MG/DL — SIGNIFICANT CHANGE UP (ref 7–23)
CALCIUM SERPL-MCNC: 8.4 MG/DL — SIGNIFICANT CHANGE UP (ref 8.4–10.5)
CHLORIDE SERPL-SCNC: 100 MMOL/L — SIGNIFICANT CHANGE UP (ref 96–108)
CO2 SERPL-SCNC: 31 MMOL/L — SIGNIFICANT CHANGE UP (ref 22–31)
CREAT SERPL-MCNC: 0.71 MG/DL — SIGNIFICANT CHANGE UP (ref 0.5–1.3)
EOSINOPHIL # BLD AUTO: 0 K/UL — SIGNIFICANT CHANGE UP (ref 0–0.5)
EOSINOPHIL NFR BLD AUTO: 0 % — SIGNIFICANT CHANGE UP (ref 0–6)
GLUCOSE SERPL-MCNC: 119 MG/DL — HIGH (ref 70–99)
HCT VFR BLD CALC: 30.2 % — LOW (ref 34.5–45)
HGB BLD-MCNC: 9.8 G/DL — LOW (ref 11.5–15.5)
LYMPHOCYTES # BLD AUTO: 0.23 K/UL — LOW (ref 1–3.3)
LYMPHOCYTES # BLD AUTO: 3 % — LOW (ref 13–44)
MANUAL SMEAR VERIFICATION: SIGNIFICANT CHANGE UP
MCHC RBC-ENTMCNC: 29.6 PG — SIGNIFICANT CHANGE UP (ref 27–34)
MCHC RBC-ENTMCNC: 32.5 GM/DL — SIGNIFICANT CHANGE UP (ref 32–36)
MCV RBC AUTO: 91.2 FL — SIGNIFICANT CHANGE UP (ref 80–100)
MONOCYTES # BLD AUTO: 0.54 K/UL — SIGNIFICANT CHANGE UP (ref 0–0.9)
MONOCYTES NFR BLD AUTO: 7 % — SIGNIFICANT CHANGE UP (ref 2–14)
NEUTROPHILS # BLD AUTO: 6.94 K/UL — SIGNIFICANT CHANGE UP (ref 1.8–7.4)
NEUTROPHILS NFR BLD AUTO: 86 % — HIGH (ref 43–77)
NEUTS BAND # BLD: 4 % — SIGNIFICANT CHANGE UP (ref 0–8)
NRBC # BLD: 0 — SIGNIFICANT CHANGE UP
NRBC # BLD: SIGNIFICANT CHANGE UP /100 WBCS (ref 0–0)
PLAT MORPH BLD: NORMAL — SIGNIFICANT CHANGE UP
PLATELET # BLD AUTO: 179 K/UL — SIGNIFICANT CHANGE UP (ref 150–400)
POTASSIUM SERPL-MCNC: 3.4 MMOL/L — LOW (ref 3.5–5.3)
POTASSIUM SERPL-SCNC: 3.4 MMOL/L — LOW (ref 3.5–5.3)
RBC # BLD: 3.31 M/UL — LOW (ref 3.8–5.2)
RBC # FLD: 12.4 % — SIGNIFICANT CHANGE UP (ref 10.3–14.5)
RBC BLD AUTO: NORMAL — SIGNIFICANT CHANGE UP
SODIUM SERPL-SCNC: 136 MMOL/L — SIGNIFICANT CHANGE UP (ref 135–145)
WBC # BLD: 7.71 K/UL — SIGNIFICANT CHANGE UP (ref 3.8–10.5)
WBC # FLD AUTO: 7.71 K/UL — SIGNIFICANT CHANGE UP (ref 3.8–10.5)

## 2019-06-26 PROCEDURE — 99233 SBSQ HOSP IP/OBS HIGH 50: CPT

## 2019-06-26 RX ORDER — CELECOXIB 200 MG/1
200 CAPSULE ORAL EVERY 12 HOURS
Refills: 0 | Status: DISCONTINUED | OUTPATIENT
Start: 2019-06-26 | End: 2019-06-28

## 2019-06-26 RX ORDER — ENOXAPARIN SODIUM 100 MG/ML
40 INJECTION SUBCUTANEOUS
Qty: 0 | Refills: 0 | DISCHARGE
Start: 2019-06-26 | End: 2019-07-09

## 2019-06-26 RX ORDER — ACETAMINOPHEN 500 MG
1000 TABLET ORAL EVERY 6 HOURS
Refills: 0 | Status: COMPLETED | OUTPATIENT
Start: 2019-06-26 | End: 2019-06-26

## 2019-06-26 RX ORDER — HALOPERIDOL DECANOATE 100 MG/ML
0.5 INJECTION INTRAMUSCULAR ONCE
Refills: 0 | Status: COMPLETED | OUTPATIENT
Start: 2019-06-26 | End: 2019-06-26

## 2019-06-26 RX ORDER — ACETAMINOPHEN 500 MG
1000 TABLET ORAL EVERY 8 HOURS
Refills: 0 | Status: DISCONTINUED | OUTPATIENT
Start: 2019-06-27 | End: 2019-06-28

## 2019-06-26 RX ORDER — POTASSIUM CHLORIDE 20 MEQ
20 PACKET (EA) ORAL ONCE
Refills: 0 | Status: COMPLETED | OUTPATIENT
Start: 2019-06-26 | End: 2019-06-26

## 2019-06-26 RX ORDER — ASPIRIN/CALCIUM CARB/MAGNESIUM 324 MG
81 TABLET ORAL DAILY
Refills: 0 | Status: DISCONTINUED | OUTPATIENT
Start: 2019-06-26 | End: 2019-06-28

## 2019-06-26 RX ORDER — HYDROMORPHONE HYDROCHLORIDE 2 MG/ML
0.5 INJECTION INTRAMUSCULAR; INTRAVENOUS; SUBCUTANEOUS ONCE
Refills: 0 | Status: DISCONTINUED | OUTPATIENT
Start: 2019-06-26 | End: 2019-06-26

## 2019-06-26 RX ADMIN — CELECOXIB 200 MILLIGRAM(S): 200 CAPSULE ORAL at 21:12

## 2019-06-26 RX ADMIN — Medication 20 MILLIEQUIVALENT(S): at 12:01

## 2019-06-26 RX ADMIN — CELECOXIB 200 MILLIGRAM(S): 200 CAPSULE ORAL at 09:07

## 2019-06-26 RX ADMIN — Medication 100 MILLIGRAM(S): at 21:12

## 2019-06-26 RX ADMIN — CELECOXIB 200 MILLIGRAM(S): 200 CAPSULE ORAL at 09:30

## 2019-06-26 RX ADMIN — SODIUM CHLORIDE 100 MILLILITER(S): 9 INJECTION, SOLUTION INTRAVENOUS at 10:49

## 2019-06-26 RX ADMIN — PANTOPRAZOLE SODIUM 40 MILLIGRAM(S): 20 TABLET, DELAYED RELEASE ORAL at 05:16

## 2019-06-26 RX ADMIN — OXYCODONE HYDROCHLORIDE 5 MILLIGRAM(S): 5 TABLET ORAL at 14:25

## 2019-06-26 RX ADMIN — OXYCODONE HYDROCHLORIDE 5 MILLIGRAM(S): 5 TABLET ORAL at 13:55

## 2019-06-26 RX ADMIN — HYDROMORPHONE HYDROCHLORIDE 0.5 MILLIGRAM(S): 2 INJECTION INTRAMUSCULAR; INTRAVENOUS; SUBCUTANEOUS at 20:38

## 2019-06-26 RX ADMIN — Medication 400 MILLIGRAM(S): at 22:08

## 2019-06-26 RX ADMIN — SERTRALINE 100 MILLIGRAM(S): 25 TABLET, FILM COATED ORAL at 12:01

## 2019-06-26 RX ADMIN — Medication 81 MILLIGRAM(S): at 13:50

## 2019-06-26 RX ADMIN — HYDROMORPHONE HYDROCHLORIDE 0.5 MILLIGRAM(S): 2 INJECTION INTRAMUSCULAR; INTRAVENOUS; SUBCUTANEOUS at 20:23

## 2019-06-26 RX ADMIN — ATORVASTATIN CALCIUM 80 MILLIGRAM(S): 80 TABLET, FILM COATED ORAL at 21:12

## 2019-06-26 RX ADMIN — Medication 100 MILLIGRAM(S): at 13:50

## 2019-06-26 RX ADMIN — Medication 1000 MILLIGRAM(S): at 22:10

## 2019-06-26 RX ADMIN — Medication 100 MILLIGRAM(S): at 05:16

## 2019-06-26 RX ADMIN — Medication 100 MILLIGRAM(S): at 00:25

## 2019-06-26 RX ADMIN — Medication 100 MILLIGRAM(S): at 08:19

## 2019-06-26 RX ADMIN — Medication 1000 MILLIGRAM(S): at 16:10

## 2019-06-26 RX ADMIN — Medication 400 MILLIGRAM(S): at 09:06

## 2019-06-26 RX ADMIN — Medication 1000 MILLIGRAM(S): at 09:25

## 2019-06-26 RX ADMIN — ENOXAPARIN SODIUM 40 MILLIGRAM(S): 100 INJECTION SUBCUTANEOUS at 09:07

## 2019-06-26 RX ADMIN — Medication 30 MILLIGRAM(S): at 05:15

## 2019-06-26 RX ADMIN — ATENOLOL 50 MILLIGRAM(S): 25 TABLET ORAL at 05:16

## 2019-06-26 RX ADMIN — Medication 400 MILLIGRAM(S): at 22:06

## 2019-06-26 RX ADMIN — Medication 400 MILLIGRAM(S): at 15:52

## 2019-06-26 NOTE — DISCHARGE NOTE PROVIDER - NSDCFUADDINST_GEN_ALL_CORE_FT
- Call your doctor if you experience:  • An increase in pain not controlled by pain medication or change in activity or  position.  • Temperature greater than 101° F.  • Redness, increased swelling or foul smelling drainage from or around the  incision.  • Numbness, tingling or a change in color or temperature of the operative leg.  • Call your doctor immediately if you experience chest pain, shortness of breath or calf pain. BMP, CBC 6/29/19    - Call your doctor if you experience:  • An increase in pain not controlled by pain medication or change in activity or  position.  • Temperature greater than 101° F.  • Redness, increased swelling or foul smelling drainage from or around the  incision.  • Numbness, tingling or a change in color or temperature of the operative leg.  • Call your doctor immediately if you experience chest pain, shortness of breath or calf pain.

## 2019-06-26 NOTE — OCCUPATIONAL THERAPY INITIAL EVALUATION ADULT - PRECAUTIONS/LIMITATIONS, REHAB EVAL
left hip precautions/surgical precautions/STRICT throughout hospitalization (no flexion greater than 90 degrees; no internal rotation; no ADDuction past the midline)/fall precautions

## 2019-06-26 NOTE — CONSULT NOTE ADULT - SUBJECTIVE AND OBJECTIVE BOX
History of Present Illness:  Reason for Admission: left hip pain    History of Present Illness:     89F with depression, HTN, HLD who presents with left hip pain after a fall.  Per daughter, patient was in her recliner and went to get up and possibly slipped and fell.  Unwitnessed fall but daughter did hear a "thud."  Patient was found on her stomach on the ground.  Patient said she might have slipped.  She denied any dizziness, lost of consciousness, headaches.  No head trauma.  Denied any chest pain, SOB, palpitations.  Patient was brought here for further evaluation.  In the ED, patient was found to have a left hip fracture (displaced femoral neck) and was admitted.  Prior to the fall, patient had one episode of diarrhea this evening but otherwise has had no other illnesses.  No recent fevers, cough, vomiting.  Patient currently has pain in the left hip. Does ambulate with assistance (walker vs cane).        {[id number]} Review of Systems:  Review of Systems: REVIEW OF SYSTEMS:  	CONSTITUTIONAL:    no fever or weight loss or fatigue  	EYES:    no eye pain or visual disturbances or discharge  	ENMT:     +hard of hearing, no tinnitus or vertigo, no sinus or throat pain  	NECK:    no pain or stiffness  	RESPIRATORY:    no cough or wheezing or chills or hemoptysis, no shortness of breath  	CARDIOVASCULAR:    no chest pain or palpitations or dizziness or leg swelling  	GASTROINTESTINAL:    +nausea, no abdominal or epigastric pain, no vomiting or hematemesis, +diarrhea, no constipation. no melena or hematochezia.  	GENITOURINARY:    no dysuria or frequency or hematuria or incontinence  	NEUROLOGICAL:    no headaches or memory loss or loss of strength or numbness or tremors  	SKIN:    no itching or burning or rashes or lesions   	LYMPH NODES:    no enlarged glands  	ENDOCRINE:    no heat or cold intolerance, no hair loss, no polydipsia or polyuria  	MUSCULOSKELETAL:    +left hip pain, no muscle or back or extremity pain  	PSYCHIATRIC:    no depression or anxiety or mood swings or difficulty sleeping  	HEME/LYMPH:    no easy bruising or bleeding gums  ALLERGY AND IMMUNOLOGIC:    no hives or eczema        Allergies and Intolerances:        Allergies:  	No Known Allergies:     Home Medications:   * Patient Currently Takes Medications as of 24-Jun-2019 00:50 documented in Structured Notes  · 	Zoloft 100 mg oral tablet: Last Dose Taken:  , 1 tab(s) orally once a day  · 	Mobic 15 mg oral tablet: Last Dose Taken:  , 1 tab(s) orally once a day  · 	Lipitor 80 mg oral tablet: Last Dose Taken:  , 1 tab(s) orally once a day  · 	Excedrin oral tablet: Last Dose Taken:  , 81 milligram(s) orally once a day  · 	NIFEdipine 30 mg oral tablet, extended release: Last Dose Taken:  , 1 tab(s) orally once a day  · 	atenolol 50 mg oral tablet: Last Dose Taken:  , 1 tab(s) orally once a day  · 	lisinopril 20 mg oral tablet: Last Dose Taken:  , 20 milligram(s) orally 2 times a day    .    Patient History:   {[id number]} Past Medical, Past Surgical, and Family History:  PAST MEDICAL HISTORY:  Anxiety     Hypertension     Macular degeneration.     PAST SURGICAL HISTORY:  No significant past surgical history.     FAMILY HISTORY:  FH: heart disease, father - family is not clear as to what it was specifically.    {[id number]} Social History:  Social History (marital status, living situation, occupation, tobacco use, alcohol and drug use, and sexual history): - no smoking  	+ drinks wine with dinner  	- no illegal drug use  	- lives with family  - walks with a cane      {[id number]} Tobacco Screening:  · Core Measure Site  No    · Has the patient used tobacco in the past 30 days?  No      Risk Assessment:   {[id number]} Present on Admission:  Deep Venous Thrombosis  no    Pulmonary Embolus  no    Urinary Catheter  no    Central Venous Catheter/PICC Line  no    Surgical Site Incision  no    Pressure Ulcer(s)  no      {[id number]} Heart Failure:  Does this patient have a history of or has been diagnosed with heart failure? unknown.      {[id number]} Physical Exam:  Physical Exam: PHYSICAL EXAM:  	Vital Signs Last 24 Hrs  	T(C): 36.7 (24 Jun 2019 02:11), Max: 36.7 (24 Jun 2019 00:35)  	T(F): 98 (24 Jun 2019 02:11), Max: 98.1 (24 Jun 2019 00:35)  	HR: 70 (24 Jun 2019 02:11) (70 - 72)  	BP: 160/70 (24 Jun 2019 02:11) (159/78 - 160/70)  	BP(mean): --  	RR: 18 (24 Jun 2019 02:11) (18 - 20)  	SpO2: 99% (24 Jun 2019 02:11) (99% - 99%)    	GENERAL:     NAD, well-groomed, well-developed  	HEAD:     atraumatic, normocephalic  	EYES:     EOMI, conjunctiva and sclera clear  	ENMT:     no tonsillar erythema or exudates or enlargement, no oral lesions, dry mucous membranes, good dentition  	NECK:     supple, no JVD  	RESPIRATORY:     clear to auscultation bilaterally, no rales or rhonchi or wheezing or rubs  	CARDIOVASCULAR:     soft s1 and s2, regular rate and rhythm, i/vi BEN LLSB, 2+ peripheral pulses  	GASTROINTESTINAL:     soft, nontender, nondistended, no hepatosplenomegaly palpated, bowel sounds present  	EXTREMITIES:     +left leg shortened and externally rotated. + groin ttp. neg ecchymosis, erythema. mild edema. + pain with attempted ROM. ROM and stability testing otherwise deferred due to known acute left hip femoral neck fx. nvid BLE. unable to straight leg raise LLE off of the bed.   	MUSCULOSKELETAL:     +left hip pain  	NERVOUS SYSTEM:     left leg ROM limited 2/2 pain, all other extremities intact, no loss of sensory  	SKIN:     no rashes or lesions  PSYCH:     appropriate, alert and orientated x3, good concentration        {[id number]} Labs and Results:  Labs, Radiology, Cardiology, and Other Results: LABS:  	           	           11.3<L>  	5.32  )-----------( 221      ( 24 Jun 2019 01:08 )  	           34.6     	139    |  103    |  31<H>  	----------------------------<  108<H>    24 Jun 2019 01:08  	3.5     |  30     |  0.87     	Ca 8.9           24 Jun 2019 01:08    	TPro  6.7    /  Alb  3.5    /  TBili  0.2    /  DBili  x      /  AST  33     /  ALT  35     /  AlkPhos  80     24 Jun 2019 01:08    	EKG:  NSR with 1st degree AV block (VA 250ms), LAD, q wave in V1-V2  	Radiology:  	CXR - prelim read by me - grossly clear lungs  Hip xray - displaced left femoral neck fracture      Assessment and Plan:   {[id number]} Assessment:  · Assessment      89F with depression, HTN, HLD who presents with left hip pain after a fall. + left hip displaced femoral neck fracture.      Left femoral fracture  - admitted to medicine  - obtain pre-op medical clearance. Has HTN and HLD but otherwise no other known cardiac disease.  Denies any cardiac symptoms such as chest pain, SOB.  - keep NPO  - pain control  - NWB LLE  - plan for OR today (6/25/19) once medically cleared (plan: cemented bipolar hip hemiarthroplasty) History of Present Illness:  Reason for Admission: left hip pain    History of Present Illness:     89F with depression, HTN, HLD who presents with left hip pain after a fall.  Per daughter, patient was in her recliner and went to get up and possibly slipped and fell.  Unwitnessed fall but daughter did hear a "thud."  Patient was found on her stomach on the ground.  Patient said she might have slipped.  She denied any dizziness, lost of consciousness, headaches.  No head trauma.  Denied any chest pain, SOB, palpitations.  Patient was brought here for further evaluation.  In the ED, patient was found to have a left hip fracture (displaced femoral neck) and was admitted.  Prior to the fall, patient had one episode of diarrhea this evening but otherwise has had no other illnesses.  No recent fevers, cough, vomiting.  Patient currently has pain in the left hip. Does ambulate with assistance (walker vs cane).        {[id number]} Review of Systems:  Review of Systems: REVIEW OF SYSTEMS:  	CONSTITUTIONAL:    no fever or weight loss or fatigue  	EYES:    no eye pain or visual disturbances or discharge  	ENMT:     +hard of hearing, no tinnitus or vertigo, no sinus or throat pain  	NECK:    no pain or stiffness  	RESPIRATORY:    no cough or wheezing or chills or hemoptysis, no shortness of breath  	CARDIOVASCULAR:    no chest pain or palpitations or dizziness or leg swelling  	GASTROINTESTINAL:    +nausea, no abdominal or epigastric pain, no vomiting or hematemesis, +diarrhea, no constipation. no melena or hematochezia.  	GENITOURINARY:    no dysuria or frequency or hematuria or incontinence  	NEUROLOGICAL:    no headaches or memory loss or loss of strength or numbness or tremors  	SKIN:    no itching or burning or rashes or lesions   	LYMPH NODES:    no enlarged glands  	ENDOCRINE:    no heat or cold intolerance, no hair loss, no polydipsia or polyuria  	MUSCULOSKELETAL:    +left hip pain, no muscle or back or extremity pain  	PSYCHIATRIC:    no depression or anxiety or mood swings or difficulty sleeping  	HEME/LYMPH:    no easy bruising or bleeding gums  ALLERGY AND IMMUNOLOGIC:    no hives or eczema        Allergies and Intolerances:        Allergies:  	No Known Allergies:     Home Medications:   * Patient Currently Takes Medications as of 24-Jun-2019 00:50 documented in Structured Notes  · 	Zoloft 100 mg oral tablet: Last Dose Taken:  , 1 tab(s) orally once a day  · 	Mobic 15 mg oral tablet: Last Dose Taken:  , 1 tab(s) orally once a day  · 	Lipitor 80 mg oral tablet: Last Dose Taken:  , 1 tab(s) orally once a day  · 	Excedrin oral tablet: Last Dose Taken:  , 81 milligram(s) orally once a day  · 	NIFEdipine 30 mg oral tablet, extended release: Last Dose Taken:  , 1 tab(s) orally once a day  · 	atenolol 50 mg oral tablet: Last Dose Taken:  , 1 tab(s) orally once a day  · 	lisinopril 20 mg oral tablet: Last Dose Taken:  , 20 milligram(s) orally 2 times a day    .    Patient History:   {[id number]} Past Medical, Past Surgical, and Family History:  PAST MEDICAL HISTORY:  Anxiety     Hypertension     Macular degeneration.     PAST SURGICAL HISTORY:  No significant past surgical history.     FAMILY HISTORY:  FH: heart disease, father - family is not clear as to what it was specifically.    {[id number]} Social History:  Social History (marital status, living situation, occupation, tobacco use, alcohol and drug use, and sexual history): - no smoking  	+ drinks wine with dinner  	- no illegal drug use  	- lives with family  - walks with a cane      {[id number]} Tobacco Screening:  · Core Measure Site  No    · Has the patient used tobacco in the past 30 days?  No      Risk Assessment:   {[id number]} Present on Admission:  Deep Venous Thrombosis  no    Pulmonary Embolus  no    Urinary Catheter  no    Central Venous Catheter/PICC Line  no    Surgical Site Incision  no    Pressure Ulcer(s)  no      {[id number]} Heart Failure:  Does this patient have a history of or has been diagnosed with heart failure? unknown.      {[id number]} Physical Exam:  Physical Exam: PHYSICAL EXAM:  	Vital Signs Last 24 Hrs  	T(C): 36.7 (24 Jun 2019 02:11), Max: 36.7 (24 Jun 2019 00:35)  	T(F): 98 (24 Jun 2019 02:11), Max: 98.1 (24 Jun 2019 00:35)  	HR: 70 (24 Jun 2019 02:11) (70 - 72)  	BP: 160/70 (24 Jun 2019 02:11) (159/78 - 160/70)  	BP(mean): --  	RR: 18 (24 Jun 2019 02:11) (18 - 20)  	SpO2: 99% (24 Jun 2019 02:11) (99% - 99%)    	GENERAL:     NAD, well-groomed, well-developed  	HEAD:     atraumatic, normocephalic  	EYES:     EOMI, conjunctiva and sclera clear  	ENMT:     no tonsillar erythema or exudates or enlargement, no oral lesions, dry mucous membranes, good dentition  	NECK:     supple, no JVD  	RESPIRATORY:     clear to auscultation bilaterally, no rales or rhonchi or wheezing or rubs  	CARDIOVASCULAR:     soft s1 and s2, regular rate and rhythm, i/vi BEN LLSB, 2+ peripheral pulses  	GASTROINTESTINAL:     soft, nontender, nondistended, no hepatosplenomegaly palpated, bowel sounds present  	EXTREMITIES:     +left leg shortened and externally rotated. + groin ttp. neg ecchymosis, erythema. mild edema. + pain with attempted ROM. ROM and stability testing otherwise deferred due to known acute left hip femoral neck fx. nvid BLE. unable to straight leg raise LLE off of the bed.   	MUSCULOSKELETAL:     +left hip pain  	NERVOUS SYSTEM:     left leg ROM limited 2/2 pain, all other extremities intact, no loss of sensory  	SKIN:     no rashes or lesions  PSYCH:     appropriate, alert and orientated x3, good concentration        {[id number]} Labs and Results:  Labs, Radiology, Cardiology, and Other Results: LABS:  	           	           11.3<L>  	5.32  )-----------( 221      ( 24 Jun 2019 01:08 )  	           34.6     	139    |  103    |  31<H>  	----------------------------<  108<H>    24 Jun 2019 01:08  	3.5     |  30     |  0.87     	Ca 8.9           24 Jun 2019 01:08    	TPro  6.7    /  Alb  3.5    /  TBili  0.2    /  DBili  x      /  AST  33     /  ALT  35     /  AlkPhos  80     24 Jun 2019 01:08    	EKG:  NSR with 1st degree AV block (DE 250ms), LAD, q wave in V1-V2  	Radiology:  	CXR - prelim read by me - grossly clear lungs  Hip xray - displaced left femoral neck fracture      Assessment and Plan:   {[id number]} Assessment:  · Assessment      89F with depression, HTN, HLD who presents with left hip pain after a fall. + left hip displaced femoral neck fracture.      Left femoral fracture  - admitted to medicine  - obtain pre-op medical clearance. Has HTN and HLD but otherwise no other known cardiac disease.  Denies any cardiac symptoms such as chest pain, SOB.  - NPO after midnight  - pain control  - NWB LLE  - plan for OR tomorrow (6/25/19) once medically cleared (plan: cemented bipolar hip hemiarthroplasty)

## 2019-06-26 NOTE — OCCUPATIONAL THERAPY INITIAL EVALUATION ADULT - PERTINENT HX OF CURRENT PROBLEM, REHAB EVAL
Left Hip Hemiarthroplasty s/p fall at home. presented to ED left hip pain after a fall.  Per daughter, patient was in her recliner and went to get up and possibly slipped and fell.  Unwitnessed fall but daughter did hear a "thud."

## 2019-06-26 NOTE — OCCUPATIONAL THERAPY INITIAL EVALUATION ADULT - AMBULATORY DEVICES NEEDED
yes/rollator outside & SAC inside. Pts daughter states pt has bony deformities Jacoby feet and pt has Dementia, + off balance & uses furniture inside to stabilize herself.

## 2019-06-26 NOTE — PROGRESS NOTE ADULT - SUBJECTIVE AND OBJECTIVE BOX
POST OPERATIVE DAY #: 1    89y Female  s/p  Left    hemiarthroplasty             SUBJECTIVE: Patient seen and examined at bedside. Patient was confused and agitated post op and overnight, improved this morning, remains forgetful. A&O x 3    Pain:  well controlled    Pain scale:   3/10  Denies CP, SOB, N/V/D, weakness, numbness   No new complains     OBJECTIVE:     Vital Signs Last 24 Hrs  T(C): 36.8 (26 Jun 2019 07:39), Max: 36.9 (25 Jun 2019 19:00)  T(F): 98.2 (26 Jun 2019 07:39), Max: 98.4 (25 Jun 2019 19:00)  HR: 73 (26 Jun 2019 07:39) (54 - 84)  BP: 125/66 (26 Jun 2019 07:39) (93/45 - 147/93)  BP(mean): --  RR: 18 (26 Jun 2019 07:39) (7 - 26)  SpO2: 94% (26 Jun 2019 07:39) (90% - 100%)    Affected extremity: LLE         Dressing: aquacell, clean/dry/intact                          Sensation: intact to light touch          Motor exam:  5 / 5 Tibialis Anterior/Gastrocnemius-Soleus, EHL/FHL         warm, well-perfused; capillary refill < 3 seconds         negative calf tenderness B/L LE  LABS: pending     I&O's Detail    25 Jun 2019 07:01  -  26 Jun 2019 07:00  --------------------------------------------------------  IN:    lactated ringers.: 1200 mL    lactated ringers.: 150 mL  Total IN: 1350 mL    OUT:  Total OUT: 0 mL    Total NET: 1350 mL  MEDICATIONS:  Infection prophylaxis:  ceFAZolin   IVPB 2000 milliGRAM(s) IV Intermittent every 8 hours    Pain management:  acetaminophen   Tablet .. 650 milliGRAM(s) Oral every 6 hours PRN  morphine  - Injectable 3 milliGRAM(s) IV Push every 4 hours PRN  OLANZapine Injectable 2.5 milliGRAM(s) IntraMuscular once  ondansetron Injectable 4 milliGRAM(s) IV Push every 6 hours PRN  oxyCODONE    IR 5 milliGRAM(s) Oral every 4 hours PRN  sertraline 100 milliGRAM(s) Oral daily    DVT prophylaxis:   enoxaparin Injectable 40 milliGRAM(s) SubCutaneous every 24 hours      RADIOLOGY & ADDITIONAL STUDIES:    ASSESSMENT AND PLAN:     - Analgesic pain control  - DVT prophylaxis:Lovenox 40mg daily    SCDs       - Pain Management: Celebrex 200mg twice a day x 21 days   - PT/OT: Weight Bearing Status:  Weight bearing as tolerated, OOBTC        Posteiror Hip precautions   Abduction pillow       -  Incentive spirometry  - IVF  - Advance diet as tolerated  - Hospitalist is following  -  Follow up labs  -  Disposition: Subacute Rehab

## 2019-06-26 NOTE — PROGRESS NOTE ADULT - ASSESSMENT
The patient is a 89 year old female with a history of HTN, OA who presents with hip fracture.    Plan:  - ECG with no evidence of ischemia or infarction  - Continue atenolol 50 mg daily  - Continue lisinopril 20 mg daily  - Continue nifedipine 30 mg daily  - PT

## 2019-06-26 NOTE — PHYSICAL THERAPY INITIAL EVALUATION ADULT - GAIT DEVIATIONS NOTED, PT EVAL
decreased weight-shifting ability/decreased step length/decreased ashtyn/decreased velocity of limb motion

## 2019-06-26 NOTE — PROGRESS NOTE ADULT - SUBJECTIVE AND OBJECTIVE BOX
Neurology Follow up note    THEE PAREDES 89y Female s/p Fall/Left Hip Fx/confusion    HPI: 89F with depression, HTN, HLD who presents with left hip pain after a fall.  Per daughter, patient was in her recliner and went to get up and possibly slipped and fell.  Unwitnessed fall but daughter did hear a "thud."  Patient was found on her stomach on the ground.  Patient said she might have slipped.  She denied any dizziness, lost of consciousness, headaches.  No head trauma.  Denied any chest pain, SOB, palpitations.  Patient was brought here for further evaluation.  In the ED, patient was found to have a left hip fracture and is being admitted for possible surgery.  Prior to the fall, patient had one episode of diarrhea this evening but otherwise has had no other illnesses.  No recent fevers, cough, vomiting.  Patient currently has pain in the left hip and feels a bit nauseous. (24 Jun 2019 02:05)    Interval History -    Patient is seen, chart was reviewed and case was discussed with the treatment team.  Pt is not in any distress.   S/p Left Hip[ Sx yesterday.  Daughter is at bedside.    Vital Signs Last 24 Hrs  T(C): 37.1 (26 Jun 2019 11:11), Max: 37.1 (26 Jun 2019 11:11)  T(F): 98.8 (26 Jun 2019 11:11), Max: 98.8 (26 Jun 2019 11:11)  HR: 76 (26 Jun 2019 11:11) (54 - 84)  BP: 96/55 (26 Jun 2019 11:11) (93/45 - 147/93)  BP(mean): --  RR: 16 (26 Jun 2019 11:11) (7 - 26)  SpO2: 94% (26 Jun 2019 11:11) (90% - 100%)    Height (cm): 157.48 (06-25 @ 16:19)  Weight (kg): 74.8 (06-25 @ 16:23)  BMI (kg/m2): 30.2 (06-25 @ 16:23)    MEDICATIONS    acetaminophen  IVPB .. 1000 milliGRAM(s) IV Intermittent every 6 hours  aluminum hydroxide/magnesium hydroxide/simethicone Suspension 30 milliLiter(s) Oral four times a day PRN  aspirin enteric coated 81 milliGRAM(s) Oral daily  ATENolol  Tablet 50 milliGRAM(s) Oral daily  atorvastatin 80 milliGRAM(s) Oral at bedtime  celecoxib 200 milliGRAM(s) Oral every 12 hours  docusate sodium 100 milliGRAM(s) Oral three times a day  enoxaparin Injectable 40 milliGRAM(s) SubCutaneous every 24 hours  lactated ringers. 1000 milliLiter(s) IV Continuous <Continuous>  magnesium hydroxide Suspension 30 milliLiter(s) Oral daily PRN  NIFEdipine XL 30 milliGRAM(s) Oral daily  OLANZapine Injectable 2.5 milliGRAM(s) IntraMuscular once  ondansetron Injectable 4 milliGRAM(s) IV Push every 6 hours PRN  oxyCODONE    IR 5 milliGRAM(s) Oral every 4 hours PRN  pantoprazole    Tablet 40 milliGRAM(s) Oral before breakfast  polyethylene glycol 3350 17 Gram(s) Oral daily PRN  senna 2 Tablet(s) Oral at bedtime PRN  sertraline 100 milliGRAM(s) Oral daily    Allergies    No Known Allergies    REVIEW OF SYSTEMS:    CONSTITUTIONAL: No fever  EYES: No eye pain,   ENMT:  No sinus or throat pain  NECK: No pain or stiffness  RESPIRATORY: No cough, No hemoptysis; No shortness of breath  CARDIOVASCULAR: No acute chest pain, palpitations,  or leg swelling  GASTROINTESTINAL: No abdominal pain. No nausea, vomiting, or hematemesis;  No melena or hematochezia.  GENITOURINARY: No  hematuria, or incontinence  MUSCULOSKELETAL: deformed joints sec to severe arthritis. S/p sx for left Hip last night.  SKIN: No itching, rashes, or lesions   LYMPH NODES: No enlarged glands  NEUROLOGICAL: No headaches, h/o memory loss,   PSYCHIATRIC: No depression, anxiety, mood swings, or difficulty sleeping  ENDOCRINE: No heat or cold intolerance;   HEME/LYMPH: No easy bruising, or bleeding gums  Allergy/Immunology. No medication allergy. No seasonal allergies.    PHYSICAL EXAM:    GENERAL: NAD, well-groomed, well-developed  HEAD:  Atraumatic, Normocephalic  EYES: EOMI, PERRLA, conjunctiva and sclera clear  NECK: Supple, No JVD, thyroid non-palpable    On Neurological Examination:    Mental Status - Pt is alert, awake, Poor cognition. Follows simple commands.    Speech -  Normal. Pt has no aphasia.    Cranial Nerves - Pupils 3 mm equal and reactive to light.  Pt has no facial asymmetry. Tongue - is in midline.    Motor Exam - Moves all extremities equally except left LE which has ACUTE left Hip Fx. .     Sensory Exam - Pt withdraws all extremities equally on stimulation except left LE, S/p sx for left Hip.     Gait - Can't be tested sec to left Hip Fx.    Deep tendon Reflexes - 2 plus all over.    Coordination - Unable to cooperate fully.     Neck Supple -  Yes.      LABS:    CBC Full  -  ( 26 Jun 2019 08:12 )  WBC Count : 7.71 K/uL  RBC Count : 3.31 M/uL  Hemoglobin : 9.8 g/dL  Hematocrit : 30.2 %  Platelet Count - Automated : 179 K/uL  Mean Cell Volume : 91.2 fl  Mean Cell Hemoglobin : 29.6 pg  Mean Cell Hemoglobin Concentration : 32.5 gm/dL  Auto Neutrophil # : 6.94 K/uL  Auto Lymphocyte # : 0.23 K/uL  Auto Monocyte # : 0.54 K/uL  Auto Eosinophil # : 0.00 K/uL  Auto Basophil # : 0.00 K/uL  Auto Neutrophil % : 86.0 %  Auto Lymphocyte % : 3.0 %  Auto Monocyte % : 7.0 %  Auto Eosinophil % : 0.0 %  Auto Basophil % : 0.0 %    06-26    136  |  100  |  18  ----------------------------<  119<H>  3.4<L>   |  31  |  0.71    Ca    8.4      26 Jun 2019 08:12      RADIOLOGY & ADDITIONAL STUDIES:    < from: CT Head No Cont (06.25.19 @ 11:47) >  IMPRESSION: No acute intracranial hemorrhage, mass effect, or shift of   the midline structures.    < end of copied text >

## 2019-06-26 NOTE — OCCUPATIONAL THERAPY INITIAL EVALUATION ADULT - GENERAL OBSERVATIONS, REHAB EVAL
Pt found in bed +IV, SCDs, abduction wedge, supplemental 02, Pt found in bed +IV, SCDs, abduction wedge, tele monitor

## 2019-06-26 NOTE — OCCUPATIONAL THERAPY INITIAL EVALUATION ADULT - ADDITIONAL COMMENTS
2 high RAFITA (daughter states they use removable step and pt needs assist to negotiate RAFITA). Pt stays on 1st level (+rental house) with Tub + doors, Pt has rollator, SAC. Pt with + Dementia, unsteady and needs assist with ADLs, IADLs & funct mob PTA

## 2019-06-26 NOTE — DISCHARGE NOTE PROVIDER - NSDCCPTREATMENT_GEN_ALL_CORE_FT
PRINCIPAL PROCEDURE  Procedure: Hemiarthroplasty of left hip  Findings and Treatment: PRINCIPAL PROCEDURE  Procedure: Hemiarthroplasty of left hip  Findings and Treatment: Left femoral neck fracture

## 2019-06-26 NOTE — PHYSICAL THERAPY INITIAL EVALUATION ADULT - RANGE OF MOTION EXAMINATION, REHAB EVAL
left hip not assessed due to hip precautions; left knee flexion/extension WFL/Right LE ROM was WFL (within functional limits)/bilateral upper extremity ROM was WFL (within functional limits)

## 2019-06-26 NOTE — PROGRESS NOTE ADULT - SUBJECTIVE AND OBJECTIVE BOX
Patient is a 89y old  Female who presents with a chief complaint of left hip pain (26 Jun 2019 09:53)      INTERVAL HPI/OVERNIGHT EVENTS:  Pt is seen and examined.  Pain is controlled.  Mental status back to baseline.    Pain Location & Control:     MEDICATIONS  (STANDING):  acetaminophen  IVPB .. 1000 milliGRAM(s) IV Intermittent every 6 hours  ATENolol  Tablet 50 milliGRAM(s) Oral daily  atorvastatin 80 milliGRAM(s) Oral at bedtime  celecoxib 200 milliGRAM(s) Oral every 12 hours  docusate sodium 100 milliGRAM(s) Oral three times a day  enoxaparin Injectable 40 milliGRAM(s) SubCutaneous every 24 hours  lactated ringers. 1000 milliLiter(s) (100 mL/Hr) IV Continuous <Continuous>  NIFEdipine XL 30 milliGRAM(s) Oral daily  OLANZapine Injectable 2.5 milliGRAM(s) IntraMuscular once  pantoprazole    Tablet 40 milliGRAM(s) Oral before breakfast  sertraline 100 milliGRAM(s) Oral daily    MEDICATIONS  (PRN):  aluminum hydroxide/magnesium hydroxide/simethicone Suspension 30 milliLiter(s) Oral four times a day PRN Indigestion  magnesium hydroxide Suspension 30 milliLiter(s) Oral daily PRN Constipation  ondansetron Injectable 4 milliGRAM(s) IV Push every 6 hours PRN Nausea and/or Vomiting  oxyCODONE    IR 5 milliGRAM(s) Oral every 4 hours PRN Moderate Pain (4 - 6)  polyethylene glycol 3350 17 Gram(s) Oral daily PRN Constipation  senna 2 Tablet(s) Oral at bedtime PRN Constipation      Allergies    No Known Allergies    Intolerances      Vital Signs Last 24 Hrs  T(C): 36.8 (26 Jun 2019 07:39), Max: 36.9 (25 Jun 2019 19:00)  T(F): 98.2 (26 Jun 2019 07:39), Max: 98.4 (25 Jun 2019 19:00)  HR: 73 (26 Jun 2019 07:39) (54 - 84)  BP: 125/66 (26 Jun 2019 07:39) (93/45 - 147/93)  BP(mean): --  RR: 18 (26 Jun 2019 07:39) (7 - 26)  SpO2: 94% (26 Jun 2019 07:39) (90% - 100%)        LABS:                        9.8    7.71  )-----------( 179      ( 26 Jun 2019 08:12 )             30.2     26 Jun 2019 08:12    136    |  100    |  18     ----------------------------<  119    3.4     |  31     |  0.71     Ca    8.4        26 Jun 2019 08:12    RADIOLOGY & ADDITIONAL TESTS:    Imaging Personally Reviewed:  [ ] YES  [ ] NO    Consultant(s) Notes Reviewed:  [ ] YES  [ ] NO    Care Discussed with Consultants/Other Providers [x ] YES  [ ] NO

## 2019-06-26 NOTE — PROGRESS NOTE ADULT - ASSESSMENT
Seen for AMS/confusion.  H/o cognitive impairment.  S/o Fall/Acute Left Hip Fx. Had Sx last night.  No Head Injury or seizure reported.  No signs of CVA  CT Head - No acute pathology.  Volume depletion. Received IV Fluids.  Pain meds.  PT when cleared by ortho.  D/w daughters at bedside.  Questions answered.  Fall/safety precautions.  Would continue to follow.

## 2019-06-26 NOTE — PROGRESS NOTE ADULT - SUBJECTIVE AND OBJECTIVE BOX
Chief Complaint: Fall    Interval Events: Underwent surgery yesterday. Less confused today.    Review of Systems:  General: No fevers, chills, weight loss or gain  Skin: No rashes, color changes  Cardiovascular: No chest pain, orthopnea  Respiratory: No shortness of breath, cough  Gastrointestinal: No nausea, abdominal pain  Genitourinary: No incontinence, pain with urination  Musculoskeletal: No pain, swelling, decreased range of motion  Neurological: No headache, weakness  Psychiatric: No depression, anxiety  Endocrine: No weight loss or gain, increased thirst  All other systems are comprehensively negative.    Physical Exam:  Vital Signs Last 24 Hrs  T(C): 36.8 (26 Jun 2019 07:39), Max: 36.9 (25 Jun 2019 19:00)  T(F): 98.2 (26 Jun 2019 07:39), Max: 98.4 (25 Jun 2019 19:00)  HR: 73 (26 Jun 2019 07:39) (54 - 84)  BP: 125/66 (26 Jun 2019 07:39) (93/45 - 147/93)  BP(mean): --  RR: 18 (26 Jun 2019 07:39) (7 - 26)  SpO2: 94% (26 Jun 2019 07:39) (90% - 100%)  General: NAD  HEENT: MMM  Neck: No JVD, no carotid bruit  Lungs: CTAB  CV: RRR, nl S1/S2, no M/R/G  Abdomen: S/NT/ND, +BS  Extremities: No LE edema, no cyanosis  Neuro: AAOx3, non-focal  Skin: No rash    Labs: Chief Complaint: Fall    Interval Events: Underwent surgery yesterday. Less confused today.    Review of Systems:  General: No fevers, chills, weight loss or gain  Skin: No rashes, color changes  Cardiovascular: No chest pain, orthopnea  Respiratory: No shortness of breath, cough  Gastrointestinal: No nausea, abdominal pain  Genitourinary: No incontinence, pain with urination  Musculoskeletal: No pain, swelling, decreased range of motion  Neurological: No headache, weakness  Psychiatric: No depression, anxiety  Endocrine: No weight loss or gain, increased thirst  All other systems are comprehensively negative.    Physical Exam:  Vital Signs Last 24 Hrs  T(C): 36.8 (26 Jun 2019 07:39), Max: 36.9 (25 Jun 2019 19:00)  T(F): 98.2 (26 Jun 2019 07:39), Max: 98.4 (25 Jun 2019 19:00)  HR: 73 (26 Jun 2019 07:39) (54 - 84)  BP: 125/66 (26 Jun 2019 07:39) (93/45 - 147/93)  BP(mean): --  RR: 18 (26 Jun 2019 07:39) (7 - 26)  SpO2: 94% (26 Jun 2019 07:39) (90% - 100%)  General: NAD  HEENT: MMM  Neck: No JVD, no carotid bruit  Lungs: CTAB  CV: RRR, nl S1/S2, no M/R/G  Abdomen: S/NT/ND, +BS  Extremities: No LE edema, no cyanosis  Neuro: AAOx3, non-focal  Skin: No rash    Labs:      Telemetry: Sinus rhythm

## 2019-06-26 NOTE — DISCHARGE NOTE PROVIDER - HOSPITAL COURSE
89 F with PMhc of depression, HTN, HLD who was BIBEMS on 6/24/19 s/p unwitnessed fall at home. In ED patient was found to have Left hip fracture, patient was admitted to surgical intervention. Patient was medically cleared, Left hip hemiarthroplasty performed on 6/25/19 by Dr. Crews. Patient was agitated and confused preop op neurologist evaluated the patient no contraindication for surgery found. No operative or izzy-operative complications arose during patients hospital course.  Patient received antibiotic according to SCIP guidelines for infection prevention. Lovenox was given for DVT prophylaxis.  Anesthesia, Medical Hospitalist, Physical Therapy and Occupational Therapy were consulted. Patient is stable for discharge with a good prognosis.  Appropriate discharge instructions and medications are provided in this document.

## 2019-06-26 NOTE — OCCUPATIONAL THERAPY INITIAL EVALUATION ADULT - IMPAIRED TRANSFERS: SIT/STAND, REHAB EVAL
Pts son & daughter present to encourage pt to participate. Pt able to side step at bedside with RW, assist x 2 plus 3rd person to assist Jacoby LE placement. Pt unsteady with dec upright posture./cognition/decreased strength

## 2019-06-26 NOTE — PHYSICAL THERAPY INITIAL EVALUATION ADULT - ADDITIONAL COMMENTS
Pt lives in a two-story rental house with adult son, 2 adult daughters, and 3 young adult grandchildren. Pt states her bedroom is on 1st floor with bathroom, and does not negotiate stairs inside. Daughter reports 2 high RAFITA; pt uses an aerobic step to enter kitchen. Pt has rollator and straight cane at home, but no RW.

## 2019-06-26 NOTE — DISCHARGE NOTE PROVIDER - NSDCCPCAREPLAN_GEN_ALL_CORE_FT
PRINCIPAL DISCHARGE DIAGNOSIS  Diagnosis: Closed fracture of left hip, initial encounter  Assessment and Plan of Treatment: Your surgical care provider is your best resource for information.  Your Physical Therapy /Occupational Therapy will include Ambulation, Transfers , Stairs, ADLs (activities of daily living), range of motion, and isometrics.  Your participation is vital for the fullest recovery and best results.  You may bear full weight as tolerated with rolling walker, cane or assistive device as determined by your therapist.  Posterior HIP PRECAUTIONS   Do not bend your hip more than 90 degrees.   Do not rotate your leg drastically inward.   Continue abduction pillow while in bed.   Sit in Hip Chair or a chair that does not allow your to bend more than 90 degrees at the hip.  Do not sit on low chairs or low toilet seats.   Keep incision clean and dry.  Change the dressing daily if there is drainage noted.  When there is no drainage the wound may be open to air.   The wound is closed with either sutures (stiches) or Prineo (glued on mesh tape.)  Both sutures and Prineo are removed 2 weeks after surgery at rehab facility or in surgeon's office.  If there is no wet drainage you may shower and pat dry with a clean towel.  Pain medication is to used if needed as prescribed. Constipation may occur from pain medications.  For Constipation :   • Increase your water intake. Drink at least 8 glasses of water daily.  • Try adding fiber to your diet by eating fruits, vegetables and foods that are rich in grains.  • If you do experience constipation, you may take an over-the-counter stool softener/laxative such as Bhavani Colace, Senekot or Milk of Magnesia.  Call the office with questions.  If you have an emergency call an ambulance or go to the emergency room.

## 2019-06-26 NOTE — PHYSICAL THERAPY INITIAL EVALUATION ADULT - IMPAIRMENTS CONTRIBUTING TO GAIT DEVIATIONS, PT EVAL
impaired balance/decreased strength/cognition/decreased ROM/pain decreased strength/+manual assist to advance bilateral LEs/decreased ROM/impaired balance/cognition/pain

## 2019-06-26 NOTE — DISCHARGE NOTE PROVIDER - CARE PROVIDER_API CALL
Dontrell Crews)  Orthopaedic Surgery; Sports Medicine  [address] [place] [address]  Phone: [phone]  Fax: [phone]  Follow Up Time: 2 weeks

## 2019-06-26 NOTE — PROGRESS NOTE ADULT - ASSESSMENT
89F with depression, HTN, HLD who presents with left hip pain after a fall.  Found to have possible left femoral fracture.      Left femoral fracture  -s/p left hip hemiarthroplasty. 6/25.  - pain control  - anti-emetics  - PT/OT  - DVT ppx -lovenox.    HTN/HLD  -lisinopril/procardia/atenolol with parameter. atovastatin.  - cont with ASA 81mg    Depression  - cont with zoloft    Altered mental status  -now back to baseline mental status.  -CT head no bleeding.    Preventive measure  lovenox.    Plan of care was discuss with patient and family at bedside, all questions were answered, seems understand and in agreement. 89F with depression, HTN, HLD who presents with left hip pain after a fall.  Found to have possible left femoral fracture.      Left femoral fracture  -s/p left hip hemiarthroplasty. 6/25.  - pain control  - anti-emetics  - PT/OT  - DVT ppx -lovenox.    HTN/HLD  -lisinopril/procardia/atenolol with parameter. atovastatin.  - cont with ASA 81mg    Depression  - cont with zoloft    Altered mental status  -now back to baseline mental status.  -CT head no bleeding.    Hypokalemia  replete and trend bmp.    Preventive measure  lovenox.    Plan of care was discuss with patient and family at bedside, all questions were answered, seems understand and in agreement.

## 2019-06-26 NOTE — OCCUPATIONAL THERAPY INITIAL EVALUATION ADULT - RANGE OF MOTION EXAMINATION, UPPER EXTREMITY
bilateral UE Active ROM was WFL  (within functional limits)/Jacoby hand arthritic deformities noted  with dec FMC, strength

## 2019-06-26 NOTE — PHYSICAL THERAPY INITIAL EVALUATION ADULT - PERTINENT HX OF CURRENT PROBLEM, REHAB EVAL
89F with PMH depression, HTN, HLD who presents with left hip pain after a fall. Per daughter, patient was in her recliner and went to get up and possibly slipped and fell. Unwitnessed fall but daughter did hear a "thud."  Pt was found on her stomach on the ground. Xray hip/pelvis in ED showed left femoral neck fx. Pt underwent L hip hemiarthroplasty (6/25/19).

## 2019-06-26 NOTE — CHART NOTE - NSCHARTNOTEFT_GEN_A_CORE
Saw patient earlier in the shift for agitation and confusion.  Patient was having confusion all day and had a head CT that was negative.  Ordered zyprexa 2.5mg IM x1.  Later, RN called stating patient was desat'ing to the mid 80s.  Went to see patient and patient was sleeping.  Changed the nasal cannula to a venti mask 30% with improvement to 98%.  Now RN reports this morning patient is completely alert and orientated, used the bathroom, and was weaned down to 2L NC.

## 2019-06-27 ENCOUNTER — TRANSCRIPTION ENCOUNTER (OUTPATIENT)
Age: 84
End: 2019-06-27

## 2019-06-27 LAB
ANION GAP SERPL CALC-SCNC: 5 MMOL/L — SIGNIFICANT CHANGE UP (ref 5–17)
APPEARANCE UR: CLEAR — SIGNIFICANT CHANGE UP
BASOPHILS # BLD AUTO: 0.01 K/UL — SIGNIFICANT CHANGE UP (ref 0–0.2)
BASOPHILS NFR BLD AUTO: 0.2 % — SIGNIFICANT CHANGE UP (ref 0–2)
BILIRUB UR-MCNC: NEGATIVE — SIGNIFICANT CHANGE UP
BUN SERPL-MCNC: 15 MG/DL — SIGNIFICANT CHANGE UP (ref 7–23)
CALCIUM SERPL-MCNC: 8.5 MG/DL — SIGNIFICANT CHANGE UP (ref 8.4–10.5)
CHLORIDE SERPL-SCNC: 103 MMOL/L — SIGNIFICANT CHANGE UP (ref 96–108)
CO2 SERPL-SCNC: 29 MMOL/L — SIGNIFICANT CHANGE UP (ref 22–31)
COLOR SPEC: YELLOW — SIGNIFICANT CHANGE UP
CREAT SERPL-MCNC: 0.64 MG/DL — SIGNIFICANT CHANGE UP (ref 0.5–1.3)
DIFF PNL FLD: NEGATIVE — SIGNIFICANT CHANGE UP
EOSINOPHIL # BLD AUTO: 0.11 K/UL — SIGNIFICANT CHANGE UP (ref 0–0.5)
EOSINOPHIL NFR BLD AUTO: 1.9 % — SIGNIFICANT CHANGE UP (ref 0–6)
GLUCOSE SERPL-MCNC: 101 MG/DL — HIGH (ref 70–99)
GLUCOSE UR QL: NEGATIVE MG/DL — SIGNIFICANT CHANGE UP
HCT VFR BLD CALC: 27.5 % — LOW (ref 34.5–45)
HGB BLD-MCNC: 9 G/DL — LOW (ref 11.5–15.5)
IMM GRANULOCYTES NFR BLD AUTO: 0.3 % — SIGNIFICANT CHANGE UP (ref 0–1.5)
KETONES UR-MCNC: NEGATIVE — SIGNIFICANT CHANGE UP
LEUKOCYTE ESTERASE UR-ACNC: NEGATIVE — SIGNIFICANT CHANGE UP
LYMPHOCYTES # BLD AUTO: 0.5 K/UL — LOW (ref 1–3.3)
LYMPHOCYTES # BLD AUTO: 8.6 % — LOW (ref 13–44)
MCHC RBC-ENTMCNC: 29.9 PG — SIGNIFICANT CHANGE UP (ref 27–34)
MCHC RBC-ENTMCNC: 32.7 GM/DL — SIGNIFICANT CHANGE UP (ref 32–36)
MCV RBC AUTO: 91.4 FL — SIGNIFICANT CHANGE UP (ref 80–100)
MONOCYTES # BLD AUTO: 0.44 K/UL — SIGNIFICANT CHANGE UP (ref 0–0.9)
MONOCYTES NFR BLD AUTO: 7.5 % — SIGNIFICANT CHANGE UP (ref 2–14)
NEUTROPHILS # BLD AUTO: 4.76 K/UL — SIGNIFICANT CHANGE UP (ref 1.8–7.4)
NEUTROPHILS NFR BLD AUTO: 81.5 % — HIGH (ref 43–77)
NITRITE UR-MCNC: NEGATIVE — SIGNIFICANT CHANGE UP
NRBC # BLD: 0 /100 WBCS — SIGNIFICANT CHANGE UP (ref 0–0)
PH UR: 7 — SIGNIFICANT CHANGE UP (ref 5–8)
PLATELET # BLD AUTO: 182 K/UL — SIGNIFICANT CHANGE UP (ref 150–400)
POTASSIUM SERPL-MCNC: 3.6 MMOL/L — SIGNIFICANT CHANGE UP (ref 3.5–5.3)
POTASSIUM SERPL-SCNC: 3.6 MMOL/L — SIGNIFICANT CHANGE UP (ref 3.5–5.3)
PROT UR-MCNC: 15 MG/DL
RBC # BLD: 3.01 M/UL — LOW (ref 3.8–5.2)
RBC # FLD: 12.7 % — SIGNIFICANT CHANGE UP (ref 10.3–14.5)
RBC CASTS # UR COMP ASSIST: SIGNIFICANT CHANGE UP /HPF (ref 0–4)
SODIUM SERPL-SCNC: 137 MMOL/L — SIGNIFICANT CHANGE UP (ref 135–145)
SP GR SPEC: 1 — LOW (ref 1.01–1.02)
UROBILINOGEN FLD QL: NEGATIVE MG/DL — SIGNIFICANT CHANGE UP
WBC # BLD: 5.84 K/UL — SIGNIFICANT CHANGE UP (ref 3.8–10.5)
WBC # FLD AUTO: 5.84 K/UL — SIGNIFICANT CHANGE UP (ref 3.8–10.5)
WBC UR QL: SIGNIFICANT CHANGE UP

## 2019-06-27 PROCEDURE — 99233 SBSQ HOSP IP/OBS HIGH 50: CPT

## 2019-06-27 RX ADMIN — CELECOXIB 200 MILLIGRAM(S): 200 CAPSULE ORAL at 09:00

## 2019-06-27 RX ADMIN — Medication 81 MILLIGRAM(S): at 13:13

## 2019-06-27 RX ADMIN — CELECOXIB 200 MILLIGRAM(S): 200 CAPSULE ORAL at 21:05

## 2019-06-27 RX ADMIN — Medication 1000 MILLIGRAM(S): at 17:50

## 2019-06-27 RX ADMIN — SERTRALINE 100 MILLIGRAM(S): 25 TABLET, FILM COATED ORAL at 13:13

## 2019-06-27 RX ADMIN — Medication 30 MILLIGRAM(S): at 06:07

## 2019-06-27 RX ADMIN — ENOXAPARIN SODIUM 40 MILLIGRAM(S): 100 INJECTION SUBCUTANEOUS at 08:31

## 2019-06-27 RX ADMIN — Medication 100 MILLIGRAM(S): at 21:07

## 2019-06-27 RX ADMIN — OXYCODONE HYDROCHLORIDE 5 MILLIGRAM(S): 5 TABLET ORAL at 23:40

## 2019-06-27 RX ADMIN — HALOPERIDOL DECANOATE 0.5 MILLIGRAM(S): 100 INJECTION INTRAMUSCULAR at 00:07

## 2019-06-27 RX ADMIN — CELECOXIB 200 MILLIGRAM(S): 200 CAPSULE ORAL at 08:31

## 2019-06-27 RX ADMIN — ATENOLOL 50 MILLIGRAM(S): 25 TABLET ORAL at 06:06

## 2019-06-27 RX ADMIN — CELECOXIB 200 MILLIGRAM(S): 200 CAPSULE ORAL at 21:06

## 2019-06-27 RX ADMIN — Medication 100 MILLIGRAM(S): at 06:06

## 2019-06-27 RX ADMIN — Medication 100 MILLIGRAM(S): at 13:13

## 2019-06-27 RX ADMIN — Medication 1000 MILLIGRAM(S): at 17:22

## 2019-06-27 RX ADMIN — ATORVASTATIN CALCIUM 80 MILLIGRAM(S): 80 TABLET, FILM COATED ORAL at 21:05

## 2019-06-27 RX ADMIN — Medication 1000 MILLIGRAM(S): at 09:00

## 2019-06-27 RX ADMIN — PANTOPRAZOLE SODIUM 40 MILLIGRAM(S): 20 TABLET, DELAYED RELEASE ORAL at 06:06

## 2019-06-27 RX ADMIN — Medication 1000 MILLIGRAM(S): at 08:31

## 2019-06-27 RX ADMIN — LISINOPRIL 20 MILLIGRAM(S): 2.5 TABLET ORAL at 06:06

## 2019-06-27 NOTE — PROGRESS NOTE ADULT - ASSESSMENT
89F with depression, HTN, HLD who presents with left hip pain after a fall.  Found to have possible left femoral fracture.      Left femoral fracture  -s/p left hip hemiarthroplasty. 6/25.  - pain control  - anti-emetics  - PT/OT  - DVT ppx -lovenox.    HTN/HLD  -lisinopril/procardia/atenolol with parameter. atovastatin.  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  -now back to baseline mental status.  -CT head no bleeding.    Hypokalemia  resolved.    Post op anemia due to acute blood loss  asymptomatics, trend cbc.    Preventive measure  lovenox.      Plan of care was discuss with patient and family at bedside, all questions were answered, seems understand and in agreement. 89F with depression, HTN, HLD who presents with left hip pain after a fall.  Found to have possible left femoral fracture.      Left femoral fracture  -s/p left hip hemiarthroplasty. 6/25.  - pain control  - anti-emetics  - PT/OT  - DVT ppx -lovenox.    HTN/HLD  -lisinopril/procardia/atenolol with parameter. atovastatin.  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  -now back to baseline mental status.  -CT head no bleeding.    Hypokalemia  resolved.    Post op anemia due to acute blood loss  asymptomatics, trend cbc.    Preventive measure  lovenox.    Discharge plan in am pending hospital course.  Plan of care was discuss with patient and family at bedside, all questions were answered, seems understand and in agreement. 89F with depression, HTN, HLD who presents with left hip pain after a fall.  Found to have possible left femoral fracture.      Left femoral fracture  -s/p left hip hemiarthroplasty. 6/25.  - pain control  - anti-emetics  - PT/OT  - DVT ppx -lovenox.    HTN/HLD  -lisinopril/procardia/atenolol with parameter. atovastatin.  - cont with ASA 81mg    Depression  - cont with zoloft    Altered mental status due to metaboloc encephalopathy likly due to meds,  being in hospital and worsening of dementia.  -now back to baseline mental status.  -CT head no bleeding.    Hypokalemia  resolved.    Post op anemia due to acute blood loss  asymptomatics, trend cbc.    Preventive measure  lovenox.    Discharge plan in am pending hospital course.  Plan of care was discuss with patient and family at bedside, all questions were answered, seems understand and in agreement.

## 2019-06-27 NOTE — PROGRESS NOTE ADULT - SUBJECTIVE AND OBJECTIVE BOX
SUBJECTIVE: Patient seen and examined of the left hip ORIF. Patient with preop dementia,.    OBJECTIVE:     Vital Signs Last 24 Hrs  T(C): 36.9 (27 Jun 2019 11:35), Max: 37 (26 Jun 2019 15:22)  T(F): 98.5 (27 Jun 2019 11:35), Max: 98.6 (26 Jun 2019 15:22)  HR: 67 (27 Jun 2019 11:35) (60 - 76)  BP: 114/66 (27 Jun 2019 11:35) (92/47 - 134/64)  BP(mean): --  RR: 18 (27 Jun 2019 11:35) (16 - 18)  SpO2: 92% (27 Jun 2019 11:35) (90% - 98%)    PAIN SCORE:         SCALE USED: (1-10 VNRS)        Affected extremity:          Dressing: clean/dry/intact   post operative aquacell dressing         Sensation:  intact to touch         Motor exam:          5/ 5 Tibialis Anterior/Gastrocnemius-Soleus , EHL         warm well perfused; capillary refill <3 seconds     LABS:                        9.0    5.84  )-----------( 182      ( 27 Jun 2019 09:11 )             27.5     06-27    137  |  103  |  15  ----------------------------<  101<H>  3.6   |  29  |  0.64    Ca    8.5      27 Jun 2019 09:11        CAPILLARY BLOOD GLUCOSE          MEDICATIONS:    Anticoagulation:  aspirin enteric coated 81 milliGRAM(s) Oral daily  enoxaparin Injectable 40 milliGRAM(s) SubCutaneous every 24 hours      Antibiotics: finished      Pain medications:   acetaminophen   Tablet .. 1000 milliGRAM(s) Oral every 8 hours  celecoxib 200 milliGRAM(s) Oral every 12 hours  OLANZapine Injectable 2.5 milliGRAM(s) IntraMuscular once  ondansetron Injectable 4 milliGRAM(s) IV Push every 6 hours PRN  oxyCODONE    IR 5 milliGRAM(s) Oral every 4 hours PRN  sertraline 100 milliGRAM(s) Oral daily      A/P :  s/p  Left hip fracture  POD # 2  -    Pain control  -    DVT ppx: ASA   -    Noted AM labs  -    Weight bearing status: WBAT   -    Physical Therapy  -    Dispo: [facility]

## 2019-06-27 NOTE — PROGRESS NOTE ADULT - SUBJECTIVE AND OBJECTIVE BOX
Chief Complaint: Fall    Interval Events: No events overnight.    Review of Systems:  General: No fevers, chills, weight loss or gain  Skin: No rashes, color changes  Cardiovascular: No chest pain, orthopnea  Respiratory: No shortness of breath, cough  Gastrointestinal: No nausea, abdominal pain  Genitourinary: No incontinence, pain with urination  Musculoskeletal: No pain, swelling, decreased range of motion  Neurological: No headache, weakness  Psychiatric: No depression, anxiety  Endocrine: No weight loss or gain, increased thirst  All other systems are comprehensively negative.    Physical Exam:  Vital Signs Last 24 Hrs  T(C): 36.7 (26 Jun 2019 23:27), Max: 37.1 (26 Jun 2019 11:11)  T(F): 98.1 (26 Jun 2019 23:27), Max: 98.8 (26 Jun 2019 11:11)  HR: 76 (27 Jun 2019 06:04) (60 - 76)  BP: 134/64 (27 Jun 2019 06:04) (92/47 - 134/64)  BP(mean): --  RR: 16 (27 Jun 2019 06:04) (16 - 16)  SpO2: 96% (27 Jun 2019 06:04) (90% - 97%)  General: NAD  HEENT: MMM  Neck: No JVD, no carotid bruit  Lungs: CTAB  CV: RRR, nl S1/S2, no M/R/G  Abdomen: S/NT/ND, +BS  Extremities: No LE edema, no cyanosis  Neuro: AAOx3, non-focal  Skin: No rash    Labs:    06-26    136  |  100  |  18  ----------------------------<  119<H>  3.4<L>   |  31  |  0.71    Ca    8.4      26 Jun 2019 08:12                          9.8    7.71  )-----------( 179      ( 26 Jun 2019 08:12 )             30.2

## 2019-06-27 NOTE — PROGRESS NOTE ADULT - ASSESSMENT
The patient is a 89 year old female with a history of HTN, OA who presents with hip fracture.    Plan:  - ECG with no evidence of ischemia or infarction  - Continue atenolol 50 mg daily  - Continue lisinopril 20 mg daily  - Continue nifedipine 30 mg daily  - PT  - Discharge planning

## 2019-06-27 NOTE — PROGRESS NOTE ADULT - ASSESSMENT
Seen for AMS/confusion.  H/o cognitive impairment.  S/o Fall/Acute Left Hip Fx. Had Sx last night.  No Head Injury or seizure reported.  No signs of CVA  CT Head - No acute pathology.  Volume depletion. Received IV Fluids.  Pain meds.  Continue PT.  D/w older daughter Brooklyn  at bedside.  Questions answered.  Fall/safety precautions.  DC planning to rehab for tomorrow.  Would continue to follow.

## 2019-06-27 NOTE — PROGRESS NOTE ADULT - SUBJECTIVE AND OBJECTIVE BOX
Neurology Follow up note    THEE PAREDES 89y Female s/p Fall left hip fx.    HPI: 89F with depression, HTN, HLD who presents with left hip pain after a fall.  Per daughter, patient was in her recliner and went to get up and possibly slipped and fell.  Unwitnessed fall but daughter did hear a "thud."  Patient was found on her stomach on the ground.  Patient said she might have slipped.  She denied any dizziness, lost of consciousness, headaches.  No head trauma.  Denied any chest pain, SOB, palpitations.  Patient was brought here for further evaluation.  In the ED, patient was found to have a left hip fracture and is being admitted for possible surgery.  Prior to the fall, patient had one episode of diarrhea this evening but otherwise has had no other illnesses.  No recent fevers, cough, vomiting.  Patient currently has pain in the left hip and feels a bit nauseous. (24 Jun 2019 02:05)    Interval History -    Patient is seen, chart was reviewed and case was discussed with the treatment team.  Pt is not in any distress.   Asleep.  Easily arousable.  Old daughter Brooklyn is at bedside says pt walked earlier with JERSEY/RW    Vital Signs Last 24 Hrs  T(C): 36.7 (27 Jun 2019 07:50), Max: 37 (26 Jun 2019 15:22)  T(F): 98 (27 Jun 2019 07:50), Max: 98.6 (26 Jun 2019 15:22)  HR: 74 (27 Jun 2019 07:50) (60 - 76)  BP: 132/63 (27 Jun 2019 07:50) (92/47 - 134/64)  BP(mean): --  RR: 16 (27 Jun 2019 07:50) (16 - 16)  SpO2: 98% (27 Jun 2019 07:50) (90% - 98%)    MEDICATIONS    acetaminophen   Tablet .. 1000 milliGRAM(s) Oral every 8 hours  aluminum hydroxide/magnesium hydroxide/simethicone Suspension 30 milliLiter(s) Oral four times a day PRN  aspirin enteric coated 81 milliGRAM(s) Oral daily  ATENolol  Tablet 50 milliGRAM(s) Oral daily  atorvastatin 80 milliGRAM(s) Oral at bedtime  bisacodyl Suppository 10 milliGRAM(s) Rectal daily PRN  celecoxib 200 milliGRAM(s) Oral every 12 hours  docusate sodium 100 milliGRAM(s) Oral three times a day  enoxaparin Injectable 40 milliGRAM(s) SubCutaneous every 24 hours  lactated ringers. 1000 milliLiter(s) IV Continuous <Continuous>  lisinopril 20 milliGRAM(s) Oral daily  magnesium hydroxide Suspension 30 milliLiter(s) Oral daily PRN  NIFEdipine XL 30 milliGRAM(s) Oral daily  OLANZapine Injectable 2.5 milliGRAM(s) IntraMuscular once  ondansetron Injectable 4 milliGRAM(s) IV Push every 6 hours PRN  oxyCODONE    IR 5 milliGRAM(s) Oral every 4 hours PRN  pantoprazole    Tablet 40 milliGRAM(s) Oral before breakfast  polyethylene glycol 3350 17 Gram(s) Oral daily PRN  senna 2 Tablet(s) Oral at bedtime PRN  sertraline 100 milliGRAM(s) Oral daily    Allergies    No Known Allergies    LABS:    CBC Full  -  ( 27 Jun 2019 09:11 )  WBC Count : 5.84 K/uL  RBC Count : 3.01 M/uL  Hemoglobin : 9.0 g/dL  Hematocrit : 27.5 %  Platelet Count - Automated : 182 K/uL  Mean Cell Volume : 91.4 fl  Mean Cell Hemoglobin : 29.9 pg  Mean Cell Hemoglobin Concentration : 32.7 gm/dL  Auto Neutrophil # : 4.76 K/uL  Auto Lymphocyte # : 0.50 K/uL  Auto Monocyte # : 0.44 K/uL  Auto Eosinophil # : 0.11 K/uL  Auto Basophil # : 0.01 K/uL  Auto Neutrophil % : 81.5 %  Auto Lymphocyte % : 8.6 %  Auto Monocyte % : 7.5 %  Auto Eosinophil % : 1.9 %  Auto Basophil % : 0.2 %    06-27    137  |  103  |  15  ----------------------------<  101<H>  3.6   |  29  |  0.64    Ca    8.5      27 Jun 2019 09:11    RADIOLOGY & ADDITIONAL STUDIES:    < from: CT Head No Cont (06.25.19 @ 11:47) >  IMPRESSION: No acute intracranial hemorrhage, mass effect, or shift of   the midline structures.    < end of copied text > Neurology Follow up note    THEE PAREDES 89y Female s/p Fall left hip fx.    HPI: 89F with depression, HTN, HLD who presents with left hip pain after a fall.  Per daughter, patient was in her recliner and went to get up and possibly slipped and fell.  Unwitnessed fall but daughter did hear a "thud."  Patient was found on her stomach on the ground.  Patient said she might have slipped.  She denied any dizziness, lost of consciousness, headaches.  No head trauma.  Denied any chest pain, SOB, palpitations.  Patient was brought here for further evaluation.  In the ED, patient was found to have a left hip fracture and is being admitted for possible surgery.  Prior to the fall, patient had one episode of diarrhea this evening but otherwise has had no other illnesses.  No recent fevers, cough, vomiting.  Patient currently has pain in the left hip and feels a bit nauseous. (24 Jun 2019 02:05)    Interval History -    Patient is seen, chart was reviewed and case was discussed with the treatment team.  Pt is not in any distress.   Asleep.  Easily arousable.  Old daughter Brooklyn is at bedside says pt walked earlier with JERSEY/RW    Vital Signs Last 24 Hrs  T(C): 36.7 (27 Jun 2019 07:50), Max: 37 (26 Jun 2019 15:22)  T(F): 98 (27 Jun 2019 07:50), Max: 98.6 (26 Jun 2019 15:22)  HR: 74 (27 Jun 2019 07:50) (60 - 76)  BP: 132/63 (27 Jun 2019 07:50) (92/47 - 134/64)  BP(mean): --  RR: 16 (27 Jun 2019 07:50) (16 - 16)  SpO2: 98% (27 Jun 2019 07:50) (90% - 98%)    MEDICATIONS    acetaminophen   Tablet .. 1000 milliGRAM(s) Oral every 8 hours  aluminum hydroxide/magnesium hydroxide/simethicone Suspension 30 milliLiter(s) Oral four times a day PRN  aspirin enteric coated 81 milliGRAM(s) Oral daily  ATENolol  Tablet 50 milliGRAM(s) Oral daily  atorvastatin 80 milliGRAM(s) Oral at bedtime  bisacodyl Suppository 10 milliGRAM(s) Rectal daily PRN  celecoxib 200 milliGRAM(s) Oral every 12 hours  docusate sodium 100 milliGRAM(s) Oral three times a day  enoxaparin Injectable 40 milliGRAM(s) SubCutaneous every 24 hours  lactated ringers. 1000 milliLiter(s) IV Continuous <Continuous>  lisinopril 20 milliGRAM(s) Oral daily  magnesium hydroxide Suspension 30 milliLiter(s) Oral daily PRN  NIFEdipine XL 30 milliGRAM(s) Oral daily  OLANZapine Injectable 2.5 milliGRAM(s) IntraMuscular once  ondansetron Injectable 4 milliGRAM(s) IV Push every 6 hours PRN  oxyCODONE    IR 5 milliGRAM(s) Oral every 4 hours PRN  pantoprazole    Tablet 40 milliGRAM(s) Oral before breakfast  polyethylene glycol 3350 17 Gram(s) Oral daily PRN  senna 2 Tablet(s) Oral at bedtime PRN  sertraline 100 milliGRAM(s) Oral daily    Allergies    No Known Allergies  REVIEW OF SYSTEMS:    CONSTITUTIONAL: No fever  EYES: No eye pain,   ENMT:  No sinus or throat pain  NECK: No pain or stiffness  RESPIRATORY: No cough, No hemoptysis; No shortness of breath  CARDIOVASCULAR: No acute chest pain, palpitations,  or leg swelling  GASTROINTESTINAL: No abdominal pain. No nausea, vomiting, or hematemesis;  No melena or hematochezia.  GENITOURINARY: No  hematuria, or incontinence  MUSCULOSKELETAL: deformed joints sec to severe arthritis. S/p sx for left Hip last night.  SKIN: No itching, rashes, or lesions   LYMPH NODES: No enlarged glands  NEUROLOGICAL: No headaches, h/o memory loss,   PSYCHIATRIC: No depression, anxiety, mood swings, or difficulty sleeping  ENDOCRINE: No heat or cold intolerance;   HEME/LYMPH: No easy bruising, or bleeding gums  Allergy/Immunology. No medication allergy. No seasonal allergies.    PHYSICAL EXAM:    GENERAL: NAD, well-groomed, well-developed  HEAD:  Atraumatic, Normocephalic  EYES: EOMI, PERRLA, conjunctiva and sclera clear  NECK: Supple, No JVD, thyroid non-palpable    On Neurological Examination:    Mental Status - Pt is alert, awake, Poor cognition. Follows simple commands.    Speech -  Normal. Pt has no aphasia.    Cranial Nerves - Pupils 3 mm equal and reactive to light.  Pt has no facial asymmetry. Tongue - is in midline.    Motor Exam - Moves all extremities equally except left LE which has ACUTE left Hip Fx. .     Sensory Exam - Pt withdraws all extremities equally on stimulation except left LE, S/p sx for left Hip.     Gait - Can't be tested sec to left Hip Fx.    Deep tendon Reflexes - 2 plus all over.    Coordination - Unable to cooperate fully.     Neck Supple -  Yes.    LABS:    CBC Full  -  ( 27 Jun 2019 09:11 )  WBC Count : 5.84 K/uL  RBC Count : 3.01 M/uL  Hemoglobin : 9.0 g/dL  Hematocrit : 27.5 %  Platelet Count - Automated : 182 K/uL  Mean Cell Volume : 91.4 fl  Mean Cell Hemoglobin : 29.9 pg  Mean Cell Hemoglobin Concentration : 32.7 gm/dL  Auto Neutrophil # : 4.76 K/uL  Auto Lymphocyte # : 0.50 K/uL  Auto Monocyte # : 0.44 K/uL  Auto Eosinophil # : 0.11 K/uL  Auto Basophil # : 0.01 K/uL  Auto Neutrophil % : 81.5 %  Auto Lymphocyte % : 8.6 %  Auto Monocyte % : 7.5 %  Auto Eosinophil % : 1.9 %  Auto Basophil % : 0.2 %    06-27    137  |  103  |  15  ----------------------------<  101<H>  3.6   |  29  |  0.64    Ca    8.5      27 Jun 2019 09:11    RADIOLOGY & ADDITIONAL STUDIES:    < from: CT Head No Cont (06.25.19 @ 11:47) >  IMPRESSION: No acute intracranial hemorrhage, mass effect, or shift of   the midline structures.    < end of copied text >

## 2019-06-27 NOTE — DISCHARGE NOTE NURSING/CASE MANAGEMENT/SOCIAL WORK - NSDCDPATPORTLINK_GEN_ALL_CORE
You can access the [facility] Portal, offered by [facility], by registering with the following website: [email or web address]

## 2019-06-27 NOTE — PROGRESS NOTE ADULT - SUBJECTIVE AND OBJECTIVE BOX
Patient is a 89y old  Female who presents with a chief complaint of left hip pain (26 Jun 2019 12:22)      INTERVAL HPI/OVERNIGHT EVENTS:  Pt is seen and examined.  Issac any pain, hard of hearing, as per family pt was very confused and agitated last night and was not able to sleep.    Pain Location & Control:     MEDICATIONS  (STANDING):  acetaminophen   Tablet .. 1000 milliGRAM(s) Oral every 8 hours  aspirin enteric coated 81 milliGRAM(s) Oral daily  ATENolol  Tablet 50 milliGRAM(s) Oral daily  atorvastatin 80 milliGRAM(s) Oral at bedtime  celecoxib 200 milliGRAM(s) Oral every 12 hours  docusate sodium 100 milliGRAM(s) Oral three times a day  enoxaparin Injectable 40 milliGRAM(s) SubCutaneous every 24 hours  lactated ringers. 1000 milliLiter(s) (100 mL/Hr) IV Continuous <Continuous>  lisinopril 20 milliGRAM(s) Oral daily  NIFEdipine XL 30 milliGRAM(s) Oral daily  OLANZapine Injectable 2.5 milliGRAM(s) IntraMuscular once  pantoprazole    Tablet 40 milliGRAM(s) Oral before breakfast  sertraline 100 milliGRAM(s) Oral daily    MEDICATIONS  (PRN):  aluminum hydroxide/magnesium hydroxide/simethicone Suspension 30 milliLiter(s) Oral four times a day PRN Indigestion  bisacodyl Suppository 10 milliGRAM(s) Rectal daily PRN If no bowel movement  magnesium hydroxide Suspension 30 milliLiter(s) Oral daily PRN Constipation  ondansetron Injectable 4 milliGRAM(s) IV Push every 6 hours PRN Nausea and/or Vomiting  oxyCODONE    IR 5 milliGRAM(s) Oral every 4 hours PRN Moderate Pain (4 - 6)  polyethylene glycol 3350 17 Gram(s) Oral daily PRN Constipation  senna 2 Tablet(s) Oral at bedtime PRN Constipation      Allergies    No Known Allergies    Intolerances      Vital Signs Last 24 Hrs  T(C): 36.7 (27 Jun 2019 07:50), Max: 37.1 (26 Jun 2019 11:11)  T(F): 98 (27 Jun 2019 07:50), Max: 98.8 (26 Jun 2019 11:11)  HR: 74 (27 Jun 2019 07:50) (60 - 76)  BP: 132/63 (27 Jun 2019 07:50) (92/47 - 134/64)  BP(mean): --  RR: 16 (27 Jun 2019 07:50) (16 - 16)  SpO2: 98% (27 Jun 2019 07:50) (90% - 98%)        LABS:                        9.0    5.84  )-----------( 182      ( 27 Jun 2019 09:11 )             27.5     27 Jun 2019 09:11    137    |  103    |  15     ----------------------------<  101    3.6     |  29     |  0.64     Ca    8.5        27 Jun 2019 09:11    RADIOLOGY & ADDITIONAL TESTS:    Imaging Personally Reviewed:  [ ] YES  [ ] NO    Consultant(s) Notes Reviewed:  [ ] YES  [ ] NO    Care Discussed with Consultants/Other Providers [x ] YES  [ ] NO

## 2019-06-27 NOTE — PROGRESS NOTE ADULT - COMMENTS
limited history is available due to underlying medical condition.

## 2019-06-28 VITALS
RESPIRATION RATE: 16 BRPM | HEART RATE: 72 BPM | DIASTOLIC BLOOD PRESSURE: 69 MMHG | TEMPERATURE: 98 F | SYSTOLIC BLOOD PRESSURE: 152 MMHG | OXYGEN SATURATION: 93 %

## 2019-06-28 LAB
ANION GAP SERPL CALC-SCNC: 3 MMOL/L — LOW (ref 5–17)
BUN SERPL-MCNC: 10 MG/DL — SIGNIFICANT CHANGE UP (ref 7–23)
CALCIUM SERPL-MCNC: 8.7 MG/DL — SIGNIFICANT CHANGE UP (ref 8.4–10.5)
CHLORIDE SERPL-SCNC: 106 MMOL/L — SIGNIFICANT CHANGE UP (ref 96–108)
CO2 SERPL-SCNC: 34 MMOL/L — HIGH (ref 22–31)
CREAT SERPL-MCNC: 0.53 MG/DL — SIGNIFICANT CHANGE UP (ref 0.5–1.3)
GLUCOSE SERPL-MCNC: 92 MG/DL — SIGNIFICANT CHANGE UP (ref 70–99)
HCT VFR BLD CALC: 28.2 % — LOW (ref 34.5–45)
HGB BLD-MCNC: 9.1 G/DL — LOW (ref 11.5–15.5)
MCHC RBC-ENTMCNC: 29.4 PG — SIGNIFICANT CHANGE UP (ref 27–34)
MCHC RBC-ENTMCNC: 32.3 GM/DL — SIGNIFICANT CHANGE UP (ref 32–36)
MCV RBC AUTO: 91.3 FL — SIGNIFICANT CHANGE UP (ref 80–100)
NRBC # BLD: 0 /100 WBCS — SIGNIFICANT CHANGE UP (ref 0–0)
PLATELET # BLD AUTO: 207 K/UL — SIGNIFICANT CHANGE UP (ref 150–400)
POTASSIUM SERPL-MCNC: 3 MMOL/L — LOW (ref 3.5–5.3)
POTASSIUM SERPL-SCNC: 3 MMOL/L — LOW (ref 3.5–5.3)
RBC # BLD: 3.09 M/UL — LOW (ref 3.8–5.2)
RBC # FLD: 12.8 % — SIGNIFICANT CHANGE UP (ref 10.3–14.5)
SODIUM SERPL-SCNC: 143 MMOL/L — SIGNIFICANT CHANGE UP (ref 135–145)
WBC # BLD: 4.44 K/UL — SIGNIFICANT CHANGE UP (ref 3.8–10.5)
WBC # FLD AUTO: 4.44 K/UL — SIGNIFICANT CHANGE UP (ref 3.8–10.5)

## 2019-06-28 PROCEDURE — 85610 PROTHROMBIN TIME: CPT

## 2019-06-28 PROCEDURE — C1889: CPT

## 2019-06-28 PROCEDURE — 86900 BLOOD TYPING SEROLOGIC ABO: CPT

## 2019-06-28 PROCEDURE — C1713: CPT

## 2019-06-28 PROCEDURE — 85027 COMPLETE CBC AUTOMATED: CPT

## 2019-06-28 PROCEDURE — 73502 X-RAY EXAM HIP UNI 2-3 VIEWS: CPT

## 2019-06-28 PROCEDURE — 86850 RBC ANTIBODY SCREEN: CPT

## 2019-06-28 PROCEDURE — 36415 COLL VENOUS BLD VENIPUNCTURE: CPT

## 2019-06-28 PROCEDURE — 80048 BASIC METABOLIC PNL TOTAL CA: CPT

## 2019-06-28 PROCEDURE — 81001 URINALYSIS AUTO W/SCOPE: CPT

## 2019-06-28 PROCEDURE — 70450 CT HEAD/BRAIN W/O DYE: CPT

## 2019-06-28 PROCEDURE — 73501 X-RAY EXAM HIP UNI 1 VIEW: CPT

## 2019-06-28 PROCEDURE — 88305 TISSUE EXAM BY PATHOLOGIST: CPT

## 2019-06-28 PROCEDURE — 88311 DECALCIFY TISSUE: CPT

## 2019-06-28 PROCEDURE — 99233 SBSQ HOSP IP/OBS HIGH 50: CPT

## 2019-06-28 PROCEDURE — 71045 X-RAY EXAM CHEST 1 VIEW: CPT

## 2019-06-28 PROCEDURE — 85730 THROMBOPLASTIN TIME PARTIAL: CPT

## 2019-06-28 PROCEDURE — 87086 URINE CULTURE/COLONY COUNT: CPT

## 2019-06-28 PROCEDURE — 96374 THER/PROPH/DIAG INJ IV PUSH: CPT

## 2019-06-28 PROCEDURE — 81003 URINALYSIS AUTO W/O SCOPE: CPT

## 2019-06-28 PROCEDURE — 99285 EMERGENCY DEPT VISIT HI MDM: CPT | Mod: 25

## 2019-06-28 PROCEDURE — 86901 BLOOD TYPING SEROLOGIC RH(D): CPT

## 2019-06-28 PROCEDURE — C1776: CPT

## 2019-06-28 PROCEDURE — 97116 GAIT TRAINING THERAPY: CPT

## 2019-06-28 PROCEDURE — 80053 COMPREHEN METABOLIC PANEL: CPT

## 2019-06-28 PROCEDURE — 84443 ASSAY THYROID STIM HORMONE: CPT

## 2019-06-28 PROCEDURE — 97165 OT EVAL LOW COMPLEX 30 MIN: CPT

## 2019-06-28 PROCEDURE — 97162 PT EVAL MOD COMPLEX 30 MIN: CPT

## 2019-06-28 PROCEDURE — 97530 THERAPEUTIC ACTIVITIES: CPT

## 2019-06-28 PROCEDURE — 93005 ELECTROCARDIOGRAM TRACING: CPT

## 2019-06-28 RX ORDER — CELECOXIB 200 MG/1
1 CAPSULE ORAL
Qty: 0 | Refills: 0 | DISCHARGE
Start: 2019-06-28

## 2019-06-28 RX ORDER — DOCUSATE SODIUM 100 MG
1 CAPSULE ORAL
Qty: 0 | Refills: 0 | DISCHARGE
Start: 2019-06-28

## 2019-06-28 RX ORDER — PANTOPRAZOLE SODIUM 20 MG/1
1 TABLET, DELAYED RELEASE ORAL
Qty: 0 | Refills: 0 | DISCHARGE
Start: 2019-06-28

## 2019-06-28 RX ORDER — ACETAMINOPHEN 500 MG
2 TABLET ORAL
Qty: 0 | Refills: 0 | DISCHARGE
Start: 2019-06-28 | End: 2019-07-09

## 2019-06-28 RX ORDER — POTASSIUM CHLORIDE 20 MEQ
40 PACKET (EA) ORAL ONCE
Refills: 0 | Status: COMPLETED | OUTPATIENT
Start: 2019-06-28 | End: 2019-06-28

## 2019-06-28 RX ORDER — SENNA PLUS 8.6 MG/1
2 TABLET ORAL
Qty: 0 | Refills: 0 | DISCHARGE
Start: 2019-06-28

## 2019-06-28 RX ORDER — ASPIRIN/CALCIUM CARB/MAGNESIUM 324 MG
1 TABLET ORAL
Qty: 0 | Refills: 0 | DISCHARGE
Start: 2019-06-28

## 2019-06-28 RX ORDER — MELOXICAM 15 MG/1
1 TABLET ORAL
Qty: 0 | Refills: 0 | DISCHARGE

## 2019-06-28 RX ORDER — OXYCODONE HYDROCHLORIDE 5 MG/1
1 TABLET ORAL
Qty: 0 | Refills: 0 | DISCHARGE
Start: 2019-06-28

## 2019-06-28 RX ORDER — POLYETHYLENE GLYCOL 3350 17 G/17G
17 POWDER, FOR SOLUTION ORAL
Qty: 0 | Refills: 0 | DISCHARGE
Start: 2019-06-28

## 2019-06-28 RX ADMIN — Medication 100 MILLIGRAM(S): at 06:01

## 2019-06-28 RX ADMIN — Medication 1000 MILLIGRAM(S): at 00:14

## 2019-06-28 RX ADMIN — Medication 81 MILLIGRAM(S): at 13:14

## 2019-06-28 RX ADMIN — Medication 30 MILLIGRAM(S): at 06:01

## 2019-06-28 RX ADMIN — Medication 100 MILLIGRAM(S): at 13:14

## 2019-06-28 RX ADMIN — ENOXAPARIN SODIUM 40 MILLIGRAM(S): 100 INJECTION SUBCUTANEOUS at 09:16

## 2019-06-28 RX ADMIN — CELECOXIB 200 MILLIGRAM(S): 200 CAPSULE ORAL at 09:15

## 2019-06-28 RX ADMIN — ATENOLOL 50 MILLIGRAM(S): 25 TABLET ORAL at 06:01

## 2019-06-28 RX ADMIN — SERTRALINE 100 MILLIGRAM(S): 25 TABLET, FILM COATED ORAL at 13:25

## 2019-06-28 RX ADMIN — PANTOPRAZOLE SODIUM 40 MILLIGRAM(S): 20 TABLET, DELAYED RELEASE ORAL at 06:00

## 2019-06-28 RX ADMIN — Medication 40 MILLIEQUIVALENT(S): at 13:21

## 2019-06-28 RX ADMIN — Medication 1000 MILLIGRAM(S): at 09:15

## 2019-06-28 RX ADMIN — OXYCODONE HYDROCHLORIDE 5 MILLIGRAM(S): 5 TABLET ORAL at 00:20

## 2019-06-28 RX ADMIN — LISINOPRIL 20 MILLIGRAM(S): 2.5 TABLET ORAL at 06:00

## 2019-06-28 NOTE — PROGRESS NOTE ADULT - REASON FOR ADMISSION
left hip pain
left hip pain  s/p ORIF
left hip pain

## 2019-06-28 NOTE — PROGRESS NOTE ADULT - PROVIDER SPECIALTY LIST ADULT
Cardiology
Hospitalist
Neurology
Orthopedics
Cardiology

## 2019-06-28 NOTE — PROGRESS NOTE ADULT - NSHPATTENDINGPLANDISCUSS_GEN_ALL_CORE
family, RN ,ortho PA.
family, SW, RN ,ortho PA
family, RN ,ortho PA, SW
family, RN, anesthesiology,ortho PA.

## 2019-06-28 NOTE — PROGRESS NOTE ADULT - SUBJECTIVE AND OBJECTIVE BOX
Chief Complaint: Fall    Interval Events: No events overnight.    Review of Systems:  General: No fevers, chills, weight loss or gain  Skin: No rashes, color changes  Cardiovascular: No chest pain, orthopnea  Respiratory: No shortness of breath, cough  Gastrointestinal: No nausea, abdominal pain  Genitourinary: No incontinence, pain with urination  Musculoskeletal: No pain, swelling, decreased range of motion  Neurological: No headache, weakness  Psychiatric: No depression, anxiety  Endocrine: No weight loss or gain, increased thirst  All other systems are comprehensively negative.    Physical Exam:  Vital Signs Last 24 Hrs  T(C): 36.4 (27 Jun 2019 23:00), Max: 36.9 (27 Jun 2019 11:35)  T(F): 97.5 (27 Jun 2019 23:00), Max: 98.5 (27 Jun 2019 11:35)  HR: 70 (28 Jun 2019 06:00) (67 - 70)  BP: 164/70 (28 Jun 2019 06:00) (114/66 - 164/70)  BP(mean): --  RR: 16 (27 Jun 2019 23:00) (16 - 18)  SpO2: 100% (27 Jun 2019 23:00) (92% - 100%)  General: NAD  HEENT: MMM  Neck: No JVD, no carotid bruit  Lungs: CTAB  CV: RRR, nl S1/S2, no M/R/G  Abdomen: S/NT/ND, +BS  Extremities: No LE edema, no cyanosis  Neuro: AAOx3, non-focal  Skin: No rash    Labs:    06-27    137  |  103  |  15  ----------------------------<  101<H>  3.6   |  29  |  0.64    Ca    8.5      27 Jun 2019 09:11                          9.0    5.84  )-----------( 182      ( 27 Jun 2019 09:11 )             27.5

## 2019-06-28 NOTE — PROGRESS NOTE ADULT - ASSESSMENT
Seen for AMS/confusion. Better now.  H/o cognitive impairment.  S/o Fall/Acute Left Hip Fx. Had Sx last night.  No Head Injury or seizure reported.  No signs of CVA  CT Head - No acute pathology.  Volume depletion. Received IV Fluids.  Pain meds.  Continue PT. Ambulation with assistance.  D/w daughter at bedside.  Questions answered.  Fall/safety precautions.  DC planning to rehab is in progress.

## 2019-06-28 NOTE — PROGRESS NOTE ADULT - SUBJECTIVE AND OBJECTIVE BOX
Neurology Follow up note    THEE PAREDES 89y Female seen for AMS.    HPI: 89F with depression, HTN, HLD who presents with left hip pain after a fall.  Per daughter, patient was in her recliner and went to get up and possibly slipped and fell.  Unwitnessed fall but daughter did hear a "thud."  Patient was found on her stomach on the ground.  Patient said she might have slipped.  She denied any dizziness, lost of consciousness, headaches.  No head trauma.  Denied any chest pain, SOB, palpitations.  Patient was brought here for further evaluation.  In the ED, patient was found to have a left hip fracture and is being admitted for possible surgery.  Prior to the fall, patient had one episode of diarrhea this evening but otherwise has had no other illnesses.  No recent fevers, cough, vomiting.  Patient currently has pain in the left hip and feels a bit nauseous. (2019 02:05)    Interval History -    Patient is seen, chart was reviewed and case was discussed with the treatment team.  Pt is not in any distress.   Daughter is at bedside.    Vital Signs Last 24 Hrs  T(C): 36.8 (2019 08:16), Max: 36.9 (2019 11:35)  T(F): 98.2 (2019 08:16), Max: 98.5 (2019 11:35)  HR: 72 (2019 08:16) (67 - 72)  BP: 152/69 (2019 08:16) (114/66 - 164/70)  BP(mean): --  RR: 16 (2019 08:16) (16 - 18)  SpO2: 93% (2019 08:16) (92% - 100%)    MEDICATIONS    acetaminophen   Tablet .. 1000 milliGRAM(s) Oral every 8 hours  aluminum hydroxide/magnesium hydroxide/simethicone Suspension 30 milliLiter(s) Oral four times a day PRN  aspirin enteric coated 81 milliGRAM(s) Oral daily  ATENolol  Tablet 50 milliGRAM(s) Oral daily  atorvastatin 80 milliGRAM(s) Oral at bedtime  bisacodyl Suppository 10 milliGRAM(s) Rectal daily PRN  celecoxib 200 milliGRAM(s) Oral every 12 hours  docusate sodium 100 milliGRAM(s) Oral three times a day  enoxaparin Injectable 40 milliGRAM(s) SubCutaneous every 24 hours  lactated ringers. 1000 milliLiter(s) IV Continuous <Continuous>  lisinopril 20 milliGRAM(s) Oral daily  magnesium hydroxide Suspension 30 milliLiter(s) Oral daily PRN  NIFEdipine XL 30 milliGRAM(s) Oral daily  OLANZapine Injectable 2.5 milliGRAM(s) IntraMuscular once  ondansetron Injectable 4 milliGRAM(s) IV Push every 6 hours PRN  oxyCODONE    IR 5 milliGRAM(s) Oral every 4 hours PRN  pantoprazole    Tablet 40 milliGRAM(s) Oral before breakfast  polyethylene glycol 3350 17 Gram(s) Oral daily PRN  potassium chloride   Powder 40 milliEquivalent(s) Oral once  senna 2 Tablet(s) Oral at bedtime PRN  sertraline 100 milliGRAM(s) Oral daily    Allergies    No Known Allergies      REVIEW OF SYSTEMS:    CONSTITUTIONAL: No fever  EYES: No eye pain,   ENMT:  No sinus or throat pain  NECK: No pain or stiffness  RESPIRATORY: No cough, No hemoptysis; No shortness of breath  CARDIOVASCULAR: No acute chest pain, palpitations,  or leg swelling  GASTROINTESTINAL: No abdominal pain. No nausea, vomiting, or hematemesis;  No melena or hematochezia.  GENITOURINARY: No  hematuria, or incontinence  MUSCULOSKELETAL: deformed joints sec to severe arthritis. S/p sx for left Hip last night.  SKIN: No itching, rashes, or lesions   LYMPH NODES: No enlarged glands  NEUROLOGICAL: No headaches, h/o memory loss,   PSYCHIATRIC: No depression, anxiety, mood swings, or difficulty sleeping  ENDOCRINE: No heat or cold intolerance;   HEME/LYMPH: No easy bruising, or bleeding gums  Allergy/Immunology. No medication allergy. No seasonal allergies.    PHYSICAL EXAM:    GENERAL: NAD, well-groomed, well-developed  HEAD:  Atraumatic, Normocephalic  EYES: EOMI, PERRLA, conjunctiva and sclera clear  NECK: Supple, No JVD, thyroid non-palpable    On Neurological Examination:    Mental Status - Pt is alert, awake, Poor cognition. Follows simple commands.    Speech -  Normal. Pt has no aphasia.    Cranial Nerves - Pupils 3 mm equal and reactive to light.  Pt has no facial asymmetry. Tongue - is in midline.    Motor Exam - Moves all extremities equally except left LE which has ACUTE left Hip Fx. .     Sensory Exam - Pt withdraws all extremities equally on stimulation except left LE, S/p sx for left Hip.     Gait - Can't be tested sec to left Hip Fx.    Deep tendon Reflexes - 2 plus all over.    Coordination - Unable to cooperate fully.     Neck Supple -  Yes.      LABS:    CBC Full  -  ( 2019 08:19 )  WBC Count : 4.44 K/uL  RBC Count : 3.09 M/uL  Hemoglobin : 9.1 g/dL  Hematocrit : 28.2 %  Platelet Count - Automated : 207 K/uL  Mean Cell Volume : 91.3 fl  Mean Cell Hemoglobin : 29.4 pg  Mean Cell Hemoglobin Concentration : 32.3 gm/dL    Urinalysis Basic - ( 2019 23:03 )    Color: Yellow / Appearance: Clear / S.005 / pH: x  Gluc: x / Ketone: Negative  / Bili: Negative / Urobili: Negative mg/dL   Blood: x / Protein: 15 mg/dL / Nitrite: Negative   Leuk Esterase: Negative / RBC: 0-2 /HPF / WBC 0-2   Sq Epi: x / Non Sq Epi: x / Bacteria: x        143  |  106  |  10  ----------------------------<  92  3.0<L>   |  34<H>  |  0.53    Ca    8.7      2019 08:19      RADIOLOGY & ADDITIONAL STUDIES:    < from: CT Head No Cont (19 @ 11:47) >  IMPRESSION: No acute intracranial hemorrhage, mass effect, or shift of   the midline structures.    < end of copied text >

## 2019-06-28 NOTE — PROGRESS NOTE ADULT - ASSESSMENT
89F with depression, HTN, HLD who presents with left hip pain after a fall.  Found to have possible left femoral fracture.      Left femoral fracture  -s/p left hip hemiarthroplasty. 6/25.  - pain control  - anti-emetics  - PT/OT  - DVT ppx -lovenox.    HTN/HLD  -lisinopril/procardia/atenolol with parameter. atovastatin.  - cont with ASA 81mg    Depression  - cont with zoloft    Altered mental status due to metaboloc encephalopathy likly due to meds,  being in hospital and worsening of dementia.  -now back to baseline mental status.  -CT head no bleeding.    Hypokalemia  replete K, check bmp on 6/29.    Post op anemia due to acute blood loss  asymptomatics, trend cbc.    Preventive measure  lovenox.      Patient is medically optimized for discharge pending PT and ortho clearance. Needs repeat bmp on 6/29/19.  Plan of care was discuss with patient and family at bedside, all questions were answered, seems understand and in agreement.

## 2019-06-28 NOTE — PROGRESS NOTE ADULT - SUBJECTIVE AND OBJECTIVE BOX
[facility]opedic [facility] POD# 3 - s/p left hip hemiarthroplasty for fracture    Patient alert, confused but comfortable in bed with oras meds for pain control.  Denies hip pain or nausea.    Vital Signs Last 24 Hrs  T(C): 36.8 (28 Jun 2019 08:16), Max: 36.8 (28 Jun 2019 08:16)  T(F): 98.2 (28 Jun 2019 08:16), Max: 98.2 (28 Jun 2019 08:16)  HR: 72 (28 Jun 2019 08:16) (68 - 72)  BP: 152/69 (28 Jun 2019 08:16) (117/60 - 164/70)  BP(mean): --  RR: 16 (28 Jun 2019 08:16) (16 - 16)  SpO2: 93% (28 Jun 2019 08:16) (93% - 100%)         I&O's Detail    27 Jun 2019 07:01  -  28 Jun 2019 07:00  --------------------------------------------------------  IN:  Total IN: 0 mL    OUT:    Voided: 650 mL  Total OUT: 650 mL    Total NET: -650 mL        Labs:                          9.1<L>  4.44  )-----------( 207      ( 28 Jun 2019 08:19 )             28.2<L>  28 Jun 2019 08:19                 28 Jun 2019 08:19    143    |  106    |  10     ----------------------------<  92     3.0<L>   |  34<H>  |  0.53     Ca    8.7        28 Jun 2019 08:19                    MEDICATIONS:acetaminophen   Tablet .. 1000 milliGRAM(s) Oral every 8 hours  aluminum hydroxide/magnesium hydroxide/simethicone Suspension 30 milliLiter(s) Oral four times a day PRN  aspirin enteric coated 81 milliGRAM(s) Oral daily  ATENolol  Tablet 50 milliGRAM(s) Oral daily  atorvastatin 80 milliGRAM(s) Oral at bedtime  bisacodyl Suppository 10 milliGRAM(s) Rectal daily PRN  celecoxib 200 milliGRAM(s) Oral every 12 hours  docusate sodium 100 milliGRAM(s) Oral three times a day  enoxaparin Injectable 40 milliGRAM(s) SubCutaneous every 24 hours  lactated ringers. 1000 milliLiter(s) IV Continuous <Continuous>  lisinopril 20 milliGRAM(s) Oral daily  magnesium hydroxide Suspension 30 milliLiter(s) Oral daily PRN  NIFEdipine XL 30 milliGRAM(s) Oral daily  OLANZapine Injectable 2.5 milliGRAM(s) IntraMuscular once  ondansetron Injectable 4 milliGRAM(s) IV Push every 6 hours PRN  oxyCODONE    IR 5 milliGRAM(s) Oral every 4 hours PRN  pantoprazole    Tablet 40 milliGRAM(s) Oral before breakfast  polyethylene glycol 3350 17 Gram(s) Oral daily PRN  potassium chloride   Powder 40 milliEquivalent(s) Oral once  senna 2 Tablet(s) Oral at bedtime PRN  sertraline 100 milliGRAM(s) Oral daily    Anticoagulation:  aspirin enteric coated 81 milliGRAM(s) Oral daily  enoxaparin Injectable 40 milliGRAM(s) SubCutaneous every 24 hours          Pain medications:   acetaminophen   Tablet .. 1000 milliGRAM(s) Oral every 8 hours  celecoxib 200 milliGRAM(s) Oral every 12 hours  OLANZapine Injectable 2.5 milliGRAM(s) IntraMuscular once  ondansetron Injectable 4 milliGRAM(s) IV Push every 6 hours PRN  oxyCODONE    IR 5 milliGRAM(s) Oral every 4 hours PRN  sertraline 100 milliGRAM(s) Oral daily                                       Physical Exam:  Left hip- Abduction pillow in place.  Primary surgical bandage removed and sterile bandage placed over dry and intact stapled incision.  Neurovascular grossly intact LE's.    PAS on LE's.  Calves soft and non-tender.                                                                                                                                                        A/P:  Orthopedically stable.  -Continue pain management with above plan  -Hypokalemia supplemented by Dr. Watson.  -DVT prophylaxis with Lovenox  -PT/OT to increase ambulation and review posterior dislocation precautions  -Medically cleared by Dr. Watson for discharge to Rehab today.  -Further plan as per attendings.

## 2019-06-28 NOTE — PROGRESS NOTE ADULT - SUBJECTIVE AND OBJECTIVE BOX
Patient is a 89y old  Female who presents with a chief complaint of left hip pain  s/p ORIF (2019 13:19)      INTERVAL HPI/OVERNIGHT EVENTS:  Pt is seen and examined.  Pain is controlled.    Pain Location & Control:     MEDICATIONS  (STANDING):  acetaminophen   Tablet .. 1000 milliGRAM(s) Oral every 8 hours  aspirin enteric coated 81 milliGRAM(s) Oral daily  ATENolol  Tablet 50 milliGRAM(s) Oral daily  atorvastatin 80 milliGRAM(s) Oral at bedtime  celecoxib 200 milliGRAM(s) Oral every 12 hours  docusate sodium 100 milliGRAM(s) Oral three times a day  enoxaparin Injectable 40 milliGRAM(s) SubCutaneous every 24 hours  lactated ringers. 1000 milliLiter(s) (100 mL/Hr) IV Continuous <Continuous>  lisinopril 20 milliGRAM(s) Oral daily  NIFEdipine XL 30 milliGRAM(s) Oral daily  OLANZapine Injectable 2.5 milliGRAM(s) IntraMuscular once  pantoprazole    Tablet 40 milliGRAM(s) Oral before breakfast  sertraline 100 milliGRAM(s) Oral daily    MEDICATIONS  (PRN):  aluminum hydroxide/magnesium hydroxide/simethicone Suspension 30 milliLiter(s) Oral four times a day PRN Indigestion  bisacodyl Suppository 10 milliGRAM(s) Rectal daily PRN If no bowel movement  magnesium hydroxide Suspension 30 milliLiter(s) Oral daily PRN Constipation  ondansetron Injectable 4 milliGRAM(s) IV Push every 6 hours PRN Nausea and/or Vomiting  oxyCODONE    IR 5 milliGRAM(s) Oral every 4 hours PRN Moderate Pain (4 - 6)  polyethylene glycol 3350 17 Gram(s) Oral daily PRN Constipation  senna 2 Tablet(s) Oral at bedtime PRN Constipation      Allergies    No Known Allergies    Intolerances            Vital Signs Last 24 Hrs  T(C): 36.8 (2019 08:16), Max: 36.9 (2019 11:35)  T(F): 98.2 (2019 08:16), Max: 98.5 (2019 11:35)  HR: 72 (2019 08:16) (67 - 72)  BP: 152/69 (2019 08:16) (114/66 - 164/70)  BP(mean): --  RR: 16 (2019 08:16) (16 - 18)  SpO2: 93% (2019 08:16) (92% - 100%)        LABS:                        9.1    4.44  )-----------( 207      ( 2019 08:19 )             28.2     2019 08:19    143    |  106    |  x      ----------------------------<  92     3.0     |  34     |  0.53     Ca    8.7        2019 08:19        Urinalysis Basic - ( 2019 23:03 )    Color: Yellow / Appearance: Clear / S.005 / pH: x  Gluc: x / Ketone: Negative  / Bili: Negative / Urobili: Negative mg/dL   Blood: x / Protein: 15 mg/dL / Nitrite: Negative   Leuk Esterase: Negative / RBC: 0-2 /HPF / WBC 0-2   Sq Epi: x / Non Sq Epi: x / Bacteria: x      CAPILLARY BLOOD GLUCOSE            Cultures          RADIOLOGY & ADDITIONAL TESTS:    Imaging Personally Reviewed:  [ ] YES  [ ] NO    Consultant(s) Notes Reviewed:  [ ] YES  [ ] NO    Care Discussed with Consultants/Other Providers [x ] YES  [ ] NO

## 2019-06-29 LAB
CULTURE RESULTS: NO GROWTH — SIGNIFICANT CHANGE UP
SPECIMEN SOURCE: SIGNIFICANT CHANGE UP

## 2019-08-30 PROBLEM — F41.9 ANXIETY DISORDER, UNSPECIFIED: Chronic | Status: ACTIVE | Noted: 2019-06-24

## 2019-08-30 PROBLEM — H35.30 UNSPECIFIED MACULAR DEGENERATION: Chronic | Status: ACTIVE | Noted: 2019-06-24

## 2019-08-30 PROBLEM — I10 ESSENTIAL (PRIMARY) HYPERTENSION: Chronic | Status: ACTIVE | Noted: 2019-06-24

## 2019-09-03 PROBLEM — Z00.00 ENCOUNTER FOR PREVENTIVE HEALTH EXAMINATION: Status: ACTIVE | Noted: 2019-09-03

## 2019-09-18 ENCOUNTER — APPOINTMENT (OUTPATIENT)
Dept: FAMILY MEDICINE | Facility: CLINIC | Age: 84
End: 2019-09-18
Payer: MEDICARE

## 2019-09-18 VITALS
OXYGEN SATURATION: 96 % | WEIGHT: 127.19 LBS | HEIGHT: 62 IN | SYSTOLIC BLOOD PRESSURE: 110 MMHG | TEMPERATURE: 98.1 F | HEART RATE: 63 BPM | RESPIRATION RATE: 16 BRPM | DIASTOLIC BLOOD PRESSURE: 64 MMHG | BODY MASS INDEX: 23.4 KG/M2

## 2019-09-18 PROCEDURE — 99204 OFFICE O/P NEW MOD 45 MIN: CPT

## 2019-09-18 NOTE — HISTORY OF PRESENT ILLNESS
[FreeTextEntry8] : Patient is a 89-year-old female who comes in as a new patient today she had been experiencing some mild cognitive impairment earlier this year and then had a hip fracture in June at which time her mental status became much worse she was hospitalized for surgery which she tolerated well then moved to a [facility] facility where it is she improved physically but her mental status remained only slightly improved and certainly much worse than baseline she does not recall her daughter's names and well her distant memory is excellent her recent memory is very poor she does not suffer however from agitation at this point she was given a trial of Exelon which was discontinued. Family is concerned about her mental status also about her multiple medications and the need to continue them review of systems reveals that she does take a diet well moves her bowels there's some trouble with urinary incontinence and this suffers from some painful feet and arthritic deformities otherwise functions reasonably well

## 2019-09-18 NOTE — PHYSICAL EXAM
[No Acute Distress] : no acute distress [Well Nourished] : well nourished [Well Developed] : well developed [Well-Appearing] : well-appearing [Normal Sclera/Conjunctiva] : normal sclera/conjunctiva [PERRL] : pupils equal round and reactive to light [Normal Outer Ear/Nose] : the outer ears and nose were normal in appearance [Normal Oropharynx] : the oropharynx was normal [No Lymphadenopathy] : no lymphadenopathy [No JVD] : no jugular venous distention [Supple] : supple [Regular Rhythm] : with a regular rhythm [No Murmur] : no murmur heard [Soft] : abdomen soft [Non Tender] : non-tender [No HSM] : no HSM [Normal Bowel Sounds] : normal bowel sounds [No Hernias] : no hernias [Normal Supraclavicular Nodes] : no supraclavicular lymphadenopathy [Normal Posterior Cervical Nodes] : no posterior cervical lymphadenopathy [Normal Anterior Cervical Nodes] : no anterior cervical lymphadenopathy [No CVA Tenderness] : no CVA  tenderness [Coordination Grossly Intact] : coordination grossly intact [Normal] : no rash [No Focal Deficits] : no focal deficits [Normal Mood] : the mood was normal [de-identified] : Bilateral hallux valgus of the feet and severe arthritic deformities bilaterally of the hands [de-identified] : 1+ edema bilateally

## 2019-09-18 NOTE — PHYSICAL EXAM
[No Acute Distress] : no acute distress [Well Developed] : well developed [Well Nourished] : well nourished [Normal Sclera/Conjunctiva] : normal sclera/conjunctiva [Well-Appearing] : well-appearing [PERRL] : pupils equal round and reactive to light [Normal Outer Ear/Nose] : the outer ears and nose were normal in appearance [Normal Oropharynx] : the oropharynx was normal [No JVD] : no jugular venous distention [No Lymphadenopathy] : no lymphadenopathy [Supple] : supple [No Murmur] : no murmur heard [Regular Rhythm] : with a regular rhythm [Soft] : abdomen soft [Non Tender] : non-tender [Normal Bowel Sounds] : normal bowel sounds [No HSM] : no HSM [No Hernias] : no hernias [Normal Posterior Cervical Nodes] : no posterior cervical lymphadenopathy [Normal Supraclavicular Nodes] : no supraclavicular lymphadenopathy [Normal Anterior Cervical Nodes] : no anterior cervical lymphadenopathy [No CVA Tenderness] : no CVA  tenderness [Coordination Grossly Intact] : coordination grossly intact [Normal] : no rash [No Focal Deficits] : no focal deficits [Normal Mood] : the mood was normal [de-identified] : 1+ edema bilateally [de-identified] : Bilateral hallux valgus of the feet and severe arthritic deformities bilaterally of the hands

## 2019-09-18 NOTE — REVIEW OF SYSTEMS
[Fever] : no fever [Chills] : no chills [Fatigue] : fatigue [Vision Problems] : no vision problems [Night Sweats] : no night sweats [Chest Pain] : no chest pain [Palpitations] : no palpitations [Leg Claudication] : no leg claudication [Orthopnea] : no orthopnea [Lower Ext Edema] : lower extremity edema [Paroysmal Nocturnal Dyspnea] : no paroysmal nocturnal dyspnea [Shortness Of Breath] : no shortness of breath [Wheezing] : no wheezing [Cough] : no cough [Abdominal Pain] : no abdominal pain [Dyspnea on Exertion] : no dyspnea on exertion [Nausea] : no nausea [Constipation] : constipation [Heartburn] : no heartburn [Vomiting] : no vomiting [Dysuria] : no dysuria [Incontinence] : incontinence [Hematuria] : no hematuria [Frequency] : frequency [Joint Stiffness] : joint stiffness [Joint Pain] : joint pain [Dizziness] : no dizziness [Headache] : no headache [Memory Loss] : memory loss [Unsteady Walking] : ataxia

## 2019-09-18 NOTE — ASSESSMENT
[FreeTextEntry1] : Main concern of family and patient is dementia with probable superimposed delirium now mildly improved but still much worse than her baseline 6 months ago they are concerned about her multiple medications and indeed it would appear that some of these can be altered we will discontinue her Lipitor which was 80 mg a day we reduce her Zoloft to 50 mg a day from 75. He'll discontinue her aspirin her blood pressure is in excellent control we will consider altering her blood pressure medications in the future should be sent for routine laboratory work and a revisit will be scheduled for 3 weeks upon my return. Extensive conversation about dementia delirium and polypharmacy was held with the patient and her daughters all of their questions were asked time of visit was approximately 40 minutes.

## 2019-09-18 NOTE — REVIEW OF SYSTEMS
[Chills] : no chills [Fever] : no fever [Fatigue] : fatigue [Night Sweats] : no night sweats [Vision Problems] : no vision problems [Chest Pain] : no chest pain [Palpitations] : no palpitations [Leg Claudication] : no leg claudication [Orthopnea] : no orthopnea [Lower Ext Edema] : lower extremity edema [Paroysmal Nocturnal Dyspnea] : no paroysmal nocturnal dyspnea [Shortness Of Breath] : no shortness of breath [Cough] : no cough [Wheezing] : no wheezing [Dyspnea on Exertion] : no dyspnea on exertion [Abdominal Pain] : no abdominal pain [Nausea] : no nausea [Constipation] : constipation [Vomiting] : no vomiting [Heartburn] : no heartburn [Incontinence] : incontinence [Dysuria] : no dysuria [Hematuria] : no hematuria [Frequency] : frequency [Joint Stiffness] : joint stiffness [Joint Pain] : joint pain [Dizziness] : no dizziness [Headache] : no headache [Memory Loss] : memory loss [Unsteady Walking] : ataxia

## 2019-09-20 ENCOUNTER — LABORATORY RESULT (OUTPATIENT)
Age: 84
End: 2019-09-20

## 2019-09-21 RX ORDER — ADHESIVE TAPE 3"X 2.3 YD
50 MCG TAPE, NON-MEDICATED TOPICAL
Qty: 90 | Refills: 3 | Status: ACTIVE | COMMUNITY
Start: 2019-09-21 | End: 1900-01-01

## 2019-09-21 RX ORDER — CHLORHEXIDINE GLUCONATE 4 %
2000 LIQUID (ML) TOPICAL DAILY
Qty: 90 | Refills: 2 | Status: ACTIVE | COMMUNITY
Start: 2019-09-21 | End: 1900-01-01

## 2019-09-23 ENCOUNTER — LABORATORY RESULT (OUTPATIENT)
Age: 84
End: 2019-09-23

## 2019-09-24 ENCOUNTER — APPOINTMENT (OUTPATIENT)
Dept: ORTHOPEDIC SURGERY | Facility: CLINIC | Age: 84
End: 2019-09-24
Payer: MEDICARE

## 2019-09-24 VITALS
BODY MASS INDEX: 23.37 KG/M2 | WEIGHT: 127 LBS | HEIGHT: 62 IN | HEART RATE: 67 BPM | SYSTOLIC BLOOD PRESSURE: 125 MMHG | DIASTOLIC BLOOD PRESSURE: 75 MMHG

## 2019-09-24 PROCEDURE — 99212 OFFICE O/P EST SF 10 MIN: CPT

## 2019-09-24 PROCEDURE — 73502 X-RAY EXAM HIP UNI 2-3 VIEWS: CPT | Mod: LT

## 2019-09-25 NOTE — HISTORY OF PRESENT ILLNESS
[Procedure: ___] : status post [unfilled] [___ Months Post Op] : [unfilled] months post op [0] : no pain reported [Clean/Dry/Intact] : clean, dry and intact [Healed] : healed [Swelling] : swollen [Neuro Intact] : an unremarkable neurological exam [Vascular Intact] : ~T peripheral vascular exam normal [Xray (Date:___)] : [unfilled] Xray -  [Chills] : no chills [Fever] : no fever [Vomiting] : no vomiting [Nausea] : no nausea [Discharge] : absent of discharge [Erythema] : not erythematous [Dehiscence] : not dehisced [Excellent Pain Control] : has excellent pain control [Doing Well] : is doing well [No Sign of Infection] : is showing no signs of infection [de-identified] : DOS: 6/25/19\par cemented left hip bipolar hemiarthroplasty performed to treat displaced left hip femoral neck fracture\par no pain complaints\par pleased with surgical results to date\par element of dementia present (patient presents today with her 2 daughters)\par doing therapy at home\par ambulating with a walker (for support and fall prevention rather than for pain or weakness complaints)\par  [de-identified] : minimal to mild residual leg edema (otherwise WNL)\par neg left hip (groin) ttp\par neg pain with passive ROM left hip \par stable to stress\par ambulating with residual antalgic gait  [de-identified] : continue at home therapy \par progress to outpatient therapy when patient ready/able to do so\par continue WBAT LLE\par continue progressive activities as tolerated\par f/up in 3 months for repeat clinical and xray f/up\par otherwise f/up prn \par  [de-identified] : a/p pelvis and left hip xrays\par s/p left hip cemented bipolar hemiarthroplasty\par components in good position/alignment\par neg loosening, osteolysis, subsidence or change in component position or alignment seen compared to initial postop films \par neg fx/dislocation

## 2019-10-11 ENCOUNTER — NON-APPOINTMENT (OUTPATIENT)
Age: 84
End: 2019-10-11

## 2019-10-11 ENCOUNTER — APPOINTMENT (OUTPATIENT)
Dept: FAMILY MEDICINE | Facility: CLINIC | Age: 84
End: 2019-10-11
Payer: MEDICARE

## 2019-10-11 VITALS
DIASTOLIC BLOOD PRESSURE: 64 MMHG | BODY MASS INDEX: 24.74 KG/M2 | HEART RATE: 63 BPM | OXYGEN SATURATION: 97 % | WEIGHT: 134.44 LBS | TEMPERATURE: 98.1 F | SYSTOLIC BLOOD PRESSURE: 100 MMHG | RESPIRATION RATE: 12 BRPM | HEIGHT: 62 IN

## 2019-10-11 DIAGNOSIS — Z23 ENCOUNTER FOR IMMUNIZATION: ICD-10-CM

## 2019-10-11 DIAGNOSIS — I10 ESSENTIAL (PRIMARY) HYPERTENSION: ICD-10-CM

## 2019-10-11 PROCEDURE — G0008: CPT

## 2019-10-11 PROCEDURE — 99214 OFFICE O/P EST MOD 30 MIN: CPT | Mod: 25

## 2019-10-11 PROCEDURE — 93000 ELECTROCARDIOGRAM COMPLETE: CPT

## 2019-10-11 PROCEDURE — 90686 IIV4 VACC NO PRSV 0.5 ML IM: CPT

## 2019-10-11 NOTE — REVIEW OF SYSTEMS
[Incontinence] : incontinence [Fever] : no fever [Chills] : no chills [Fatigue] : no fatigue [Night Sweats] : no night sweats [Nasal Discharge] : no nasal discharge [Sore Throat] : no sore throat [Chest Pain] : no chest pain [Palpitations] : no palpitations [Orthopnea] : no orthopnea [Paroxysmal Nocturnal Dyspnea] : no paroxysmal nocturnal dyspnea [Shortness Of Breath] : no shortness of breath [Wheezing] : no wheezing [Cough] : no cough [Dyspnea on Exertion] : no dyspnea on exertion [Abdominal Pain] : no abdominal pain [Nausea] : no nausea [Constipation] : no constipation [Vomiting] : no vomiting [Heartburn] : no heartburn [Melena] : no melena [Dysuria] : no dysuria [Hematuria] : no hematuria

## 2019-10-11 NOTE — ASSESSMENT
[FreeTextEntry1] : The patient is here because she is having some anxiety issues she is the middle of a lawsuit with her brother about her home and is having other issues with her  she has taken Xanax in the past at one time been on antidepressants but is not taking them currently she does not feel like she's been on herself she just feels she needs some situational relief typically for court appearances otherwise she feels well review of systems is unremarkable. /60 willm dc lisinopril 1 mo follow up Ekg wnl

## 2019-10-11 NOTE — PHYSICAL EXAM
[No Acute Distress] : no acute distress [Well Nourished] : well nourished [Well Developed] : well developed [Normal Sclera/Conjunctiva] : normal sclera/conjunctiva [PERRL] : pupils equal round and reactive to light [Normal Oropharynx] : the oropharynx was normal [No JVD] : no jugular venous distention [No Lymphadenopathy] : no lymphadenopathy [No Respiratory Distress] : no respiratory distress  [No Accessory Muscle Use] : no accessory muscle use [Clear to Auscultation] : lungs were clear to auscultation bilaterally [Normal Rate] : normal rate  [Regular Rhythm] : with a regular rhythm [Normal S1, S2] : normal S1 and S2 [No Murmur] : no murmur heard [No Edema] : there was no peripheral edema [Normal Supraclavicular Nodes] : no supraclavicular lymphadenopathy [Normal Posterior Cervical Nodes] : no posterior cervical lymphadenopathy [Normal Anterior Cervical Nodes] : no anterior cervical lymphadenopathy [Normal] : no CVA or spinal tenderness

## 2019-10-11 NOTE — HISTORY OF PRESENT ILLNESS
[FreeTextEntry1] : memory loss, instable gait [de-identified] : CC\par Followup on her memory loss and stable gait she feels reasonably well at the time of her last visit we simplified her medications by discontinuing her statin and reducing her Zoloft and there've been apparently no untoward effects from that laboratory work revealed a significant B12 deficiency and vitamin D deficiency which are being replaced and a CAT scan of the head was done previously was obtained which shows significant vascular issues of a chronic nature no acute bleeds or or strokes there is significant for cerebral atrophy

## 2019-12-06 ENCOUNTER — APPOINTMENT (OUTPATIENT)
Dept: FAMILY MEDICINE | Facility: CLINIC | Age: 84
End: 2019-12-06
Payer: MEDICARE

## 2019-12-06 VITALS
DIASTOLIC BLOOD PRESSURE: 74 MMHG | HEART RATE: 70 BPM | HEIGHT: 62 IN | SYSTOLIC BLOOD PRESSURE: 110 MMHG | TEMPERATURE: 97.5 F | OXYGEN SATURATION: 95 % | RESPIRATION RATE: 16 BRPM | BODY MASS INDEX: 25.65 KG/M2 | WEIGHT: 139.38 LBS

## 2019-12-06 DIAGNOSIS — E53.8 DEFICIENCY OF OTHER SPECIFIED B GROUP VITAMINS: ICD-10-CM

## 2019-12-06 DIAGNOSIS — F03.90 UNSPECIFIED DEMENTIA W/OUT BEHAVIORAL DISTURBANCE: ICD-10-CM

## 2019-12-06 PROCEDURE — 99214 OFFICE O/P EST MOD 30 MIN: CPT

## 2019-12-06 RX ORDER — NIFEDIPINE 30 MG/1
30 TABLET, EXTENDED RELEASE ORAL DAILY
Refills: 0 | Status: ACTIVE | COMMUNITY
Start: 2019-12-06

## 2019-12-06 NOTE — HISTORY OF PRESENT ILLNESS
[FreeTextEntry1] : memory loss [de-identified] : Patient to follow up on agitation memory loss and also vitamin B12 and the deficiency she is accompanied by her daughter and her son-in-law she feels well does still occasionally become a bit anxious and somewhat confused review of the CAT scan has revealed the multi-infarct type situation patient had been tried on Exelon the past without good results but the trial was only for several months apparently and family is wondering if her blood pressure medication can be reduced and as her blood pressure is quite good in the 110/70 area review of systems is unremarkable with the exception of some nocturia however the patient successfully uses a commode without incident

## 2019-12-06 NOTE — REVIEW OF SYSTEMS
[Dysuria] : no dysuria [Incontinence] : no incontinence [Hematuria] : no hematuria [Nocturia] : nocturia [Frequency] : frequency [Headache] : no headache [Confusion] : confusion [Dizziness] : no dizziness [Memory Loss] : memory loss [Negative] : Musculoskeletal [FreeTextEntry3] : Macular degeneration being followed by ophthalmology

## 2019-12-06 NOTE — ASSESSMENT
[FreeTextEntry1] : Patient seems to be quite well we had a very long conversation with the patient and her family about dementia confusion memory loss and also about her urinary frequency and how to manage that as her blood pressure is on the low side we will reduce her nifedipine from twice a day to once a day we will consider addition of Namenda if there are no untoward effects from reducing her nifedipine we do not want to put 2 variables into play at the same time patient be followup in one month

## 2019-12-06 NOTE — PHYSICAL EXAM
[No Acute Distress] : no acute distress [Well Developed] : well developed [Normal Sclera/Conjunctiva] : normal sclera/conjunctiva [PERRL] : pupils equal round and reactive to light [Normal Outer Ear/Nose] : the outer ears and nose were normal in appearance [Normal Oropharynx] : the oropharynx was normal [No JVD] : no jugular venous distention [No Respiratory Distress] : no respiratory distress  [Normal Rate] : normal rate  [Soft] : abdomen soft [Regular Rhythm] : with a regular rhythm [Non Tender] : non-tender [No Masses] : no abdominal mass palpated [No HSM] : no HSM [Normal Bowel Sounds] : normal bowel sounds [Normal Supraclavicular Nodes] : no supraclavicular lymphadenopathy [Normal Anterior Cervical Nodes] : no anterior cervical lymphadenopathy [No CVA Tenderness] : no CVA  tenderness [No Spinal Tenderness] : no spinal tenderness [No Focal Deficits] : no focal deficits [Coordination Grossly Intact] : coordination grossly intact [de-identified] : trace edema in the ankles

## 2019-12-19 ENCOUNTER — FORM ENCOUNTER (OUTPATIENT)
Age: 84
End: 2019-12-19

## 2019-12-20 ENCOUNTER — APPOINTMENT (OUTPATIENT)
Dept: FAMILY MEDICINE | Facility: CLINIC | Age: 84
End: 2019-12-20
Payer: MEDICARE

## 2019-12-20 ENCOUNTER — OUTPATIENT (OUTPATIENT)
Dept: OUTPATIENT SERVICES | Facility: HOSPITAL | Age: 84
LOS: 1 days | End: 2019-12-20
Payer: MEDICARE

## 2019-12-20 ENCOUNTER — APPOINTMENT (OUTPATIENT)
Dept: RADIOLOGY | Facility: HOSPITAL | Age: 84
End: 2019-12-20
Payer: MEDICARE

## 2019-12-20 VITALS
WEIGHT: 139 LBS | BODY MASS INDEX: 25.58 KG/M2 | DIASTOLIC BLOOD PRESSURE: 62 MMHG | RESPIRATION RATE: 16 BRPM | SYSTOLIC BLOOD PRESSURE: 102 MMHG | HEART RATE: 71 BPM | OXYGEN SATURATION: 98 % | HEIGHT: 62 IN

## 2019-12-20 DIAGNOSIS — R10.31 RIGHT LOWER QUADRANT PAIN: ICD-10-CM

## 2019-12-20 PROCEDURE — 73502 X-RAY EXAM HIP UNI 2-3 VIEWS: CPT | Mod: 26,RT

## 2019-12-20 PROCEDURE — 73502 X-RAY EXAM HIP UNI 2-3 VIEWS: CPT

## 2019-12-20 PROCEDURE — 99213 OFFICE O/P EST LOW 20 MIN: CPT

## 2019-12-20 NOTE — PHYSICAL EXAM
[Well Nourished] : well nourished [Well Developed] : well developed [PERRL] : pupils equal round and reactive to light [Normal Sclera/Conjunctiva] : normal sclera/conjunctiva [Normal Oropharynx] : the oropharynx was normal [No JVD] : no jugular venous distention [No Lymphadenopathy] : no lymphadenopathy [No Accessory Muscle Use] : no accessory muscle use [No Respiratory Distress] : no respiratory distress  [Thyroid Normal, No Nodules] : the thyroid was normal and there were no nodules present [Normal Percussion] : the chest was normal to percussion [Normal Rate] : normal rate  [No Murmur] : no murmur heard [Regular Rhythm] : with a regular rhythm [No Edema] : there was no peripheral edema [Soft] : abdomen soft [Non Tender] : non-tender [Non-distended] : non-distended [No Masses] : no abdominal mass palpated [No HSM] : no HSM [Normal Supraclavicular Nodes] : no supraclavicular lymphadenopathy [Normal Bowel Sounds] : normal bowel sounds [Normal Anterior Cervical Nodes] : no anterior cervical lymphadenopathy [No CVA Tenderness] : no CVA  tenderness [de-identified] : mild distress [de-identified] : Pain on movement of the right hip joint

## 2019-12-20 NOTE — ASSESSMENT
[FreeTextEntry1] : Will send patient for x-ray of the right hip this afternoon Tylenol 2 every 8 hours until that time abdominal exam is unremarkable likely arthritis of the right hip or other orthopedic issue

## 2019-12-20 NOTE — HISTORY OF PRESENT ILLNESS
[FreeTextEntry1] : r groin pain [de-identified] : Patient has been experiencing right groin pain for the last several days this is a much worse upon movement or trying to rise from a seated position there has been no injury no prior similar pain no fever chills or systemic symptoms no dysuria bowel problems or other related abnormalities

## 2019-12-20 NOTE — REVIEW OF SYSTEMS
[Chills] : no chills [Fever] : no fever [Night Sweats] : no night sweats [Fatigue] : no fatigue [Shortness Of Breath] : no shortness of breath [Palpitations] : no palpitations [Orthopnea] : no orthopnea [Chest Pain] : chest pain [Wheezing] : no wheezing [Cough] : no cough [Nausea] : no nausea [Constipation] : no constipation [Dyspnea on Exertion] : no dyspnea on exertion [Vomiting] : no vomiting [Heartburn] : no heartburn [Melena] : no melena [Incontinence] : no incontinence [Dysuria] : no dysuria [Hematuria] : no hematuria [Frequency] : no frequency [Joint Stiffness] : joint stiffness [Joint Pain] : joint pain [Back Pain] : no back pain [Dizziness] : no dizziness [Headache] : no headache [FreeTextEntry9] : right groin pain

## 2019-12-23 RX ORDER — ATENOLOL 50 MG/1
50 TABLET ORAL TWICE DAILY
Qty: 180 | Refills: 3 | Status: ACTIVE | COMMUNITY
Start: 2019-12-06 | End: 1900-01-01

## 2020-01-11 ENCOUNTER — INPATIENT (INPATIENT)
Facility: HOSPITAL | Age: 85
LOS: 0 days | DRG: 951 | End: 2020-01-12
Attending: STUDENT IN AN ORGANIZED HEALTH CARE EDUCATION/TRAINING PROGRAM | Admitting: STUDENT IN AN ORGANIZED HEALTH CARE EDUCATION/TRAINING PROGRAM
Payer: MEDICARE

## 2020-01-11 VITALS
HEART RATE: 83 BPM | TEMPERATURE: 98 F | SYSTOLIC BLOOD PRESSURE: 181 MMHG | DIASTOLIC BLOOD PRESSURE: 77 MMHG | OXYGEN SATURATION: 94 % | RESPIRATION RATE: 14 BRPM

## 2020-01-11 VITALS
HEIGHT: 63 IN | RESPIRATION RATE: 20 BRPM | TEMPERATURE: 99 F | DIASTOLIC BLOOD PRESSURE: 88 MMHG | SYSTOLIC BLOOD PRESSURE: 190 MMHG | OXYGEN SATURATION: 100 % | WEIGHT: 138.01 LBS | HEART RATE: 89 BPM

## 2020-01-11 DIAGNOSIS — I61.9 NONTRAUMATIC INTRACEREBRAL HEMORRHAGE, UNSPECIFIED: ICD-10-CM

## 2020-01-11 LAB
ALBUMIN SERPL ELPH-MCNC: 3.5 G/DL — SIGNIFICANT CHANGE UP (ref 3.3–5)
ALP SERPL-CCNC: 88 U/L — SIGNIFICANT CHANGE UP (ref 30–120)
ALT FLD-CCNC: 15 U/L DA — SIGNIFICANT CHANGE UP (ref 10–60)
ANION GAP SERPL CALC-SCNC: 9 MMOL/L — SIGNIFICANT CHANGE UP (ref 5–17)
APTT BLD: 27.4 SEC — LOW (ref 28.5–37)
AST SERPL-CCNC: 25 U/L — SIGNIFICANT CHANGE UP (ref 10–40)
BASOPHILS # BLD AUTO: 0.04 K/UL — SIGNIFICANT CHANGE UP (ref 0–0.2)
BASOPHILS NFR BLD AUTO: 0.4 % — SIGNIFICANT CHANGE UP (ref 0–2)
BILIRUB SERPL-MCNC: 0.4 MG/DL — SIGNIFICANT CHANGE UP (ref 0.2–1.2)
BUN SERPL-MCNC: 18 MG/DL — SIGNIFICANT CHANGE UP (ref 7–23)
CALCIUM SERPL-MCNC: 9.1 MG/DL — SIGNIFICANT CHANGE UP (ref 8.4–10.5)
CHLORIDE SERPL-SCNC: 102 MMOL/L — SIGNIFICANT CHANGE UP (ref 96–108)
CO2 SERPL-SCNC: 27 MMOL/L — SIGNIFICANT CHANGE UP (ref 22–31)
CREAT SERPL-MCNC: 0.67 MG/DL — SIGNIFICANT CHANGE UP (ref 0.5–1.3)
EOSINOPHIL # BLD AUTO: 0 K/UL — SIGNIFICANT CHANGE UP (ref 0–0.5)
EOSINOPHIL NFR BLD AUTO: 0 % — SIGNIFICANT CHANGE UP (ref 0–6)
GLUCOSE SERPL-MCNC: 180 MG/DL — HIGH (ref 70–99)
HCT VFR BLD CALC: 38.4 % — SIGNIFICANT CHANGE UP (ref 34.5–45)
HGB BLD-MCNC: 12.8 G/DL — SIGNIFICANT CHANGE UP (ref 11.5–15.5)
IMM GRANULOCYTES NFR BLD AUTO: 0.3 % — SIGNIFICANT CHANGE UP (ref 0–1.5)
INR BLD: 1.01 RATIO — SIGNIFICANT CHANGE UP (ref 0.88–1.16)
LYMPHOCYTES # BLD AUTO: 0.84 K/UL — LOW (ref 1–3.3)
LYMPHOCYTES # BLD AUTO: 8.8 % — LOW (ref 13–44)
MCHC RBC-ENTMCNC: 29.6 PG — SIGNIFICANT CHANGE UP (ref 27–34)
MCHC RBC-ENTMCNC: 33.3 GM/DL — SIGNIFICANT CHANGE UP (ref 32–36)
MCV RBC AUTO: 88.7 FL — SIGNIFICANT CHANGE UP (ref 80–100)
MONOCYTES # BLD AUTO: 0.28 K/UL — SIGNIFICANT CHANGE UP (ref 0–0.9)
MONOCYTES NFR BLD AUTO: 2.9 % — SIGNIFICANT CHANGE UP (ref 2–14)
NEUTROPHILS # BLD AUTO: 8.31 K/UL — HIGH (ref 1.8–7.4)
NEUTROPHILS NFR BLD AUTO: 87.6 % — HIGH (ref 43–77)
NRBC # BLD: 0 /100 WBCS — SIGNIFICANT CHANGE UP (ref 0–0)
PLATELET # BLD AUTO: 306 K/UL — SIGNIFICANT CHANGE UP (ref 150–400)
POTASSIUM SERPL-MCNC: 3.4 MMOL/L — LOW (ref 3.5–5.3)
POTASSIUM SERPL-SCNC: 3.4 MMOL/L — LOW (ref 3.5–5.3)
PROT SERPL-MCNC: 7.8 G/DL — SIGNIFICANT CHANGE UP (ref 6–8.3)
PROTHROM AB SERPL-ACNC: 11.1 SEC — SIGNIFICANT CHANGE UP (ref 10–12.9)
RBC # BLD: 4.33 M/UL — SIGNIFICANT CHANGE UP (ref 3.8–5.2)
RBC # FLD: 12.7 % — SIGNIFICANT CHANGE UP (ref 10.3–14.5)
SODIUM SERPL-SCNC: 138 MMOL/L — SIGNIFICANT CHANGE UP (ref 135–145)
WBC # BLD: 9.5 K/UL — SIGNIFICANT CHANGE UP (ref 3.8–10.5)
WBC # FLD AUTO: 9.5 K/UL — SIGNIFICANT CHANGE UP (ref 3.8–10.5)

## 2020-01-11 PROCEDURE — 71045 X-RAY EXAM CHEST 1 VIEW: CPT | Mod: 26

## 2020-01-11 PROCEDURE — 70450 CT HEAD/BRAIN W/O DYE: CPT | Mod: 26

## 2020-01-11 PROCEDURE — 99223 1ST HOSP IP/OBS HIGH 75: CPT | Mod: AI

## 2020-01-11 PROCEDURE — 99291 CRITICAL CARE FIRST HOUR: CPT

## 2020-01-11 RX ORDER — ATENOLOL 25 MG/1
1 TABLET ORAL
Qty: 0 | Refills: 0 | DISCHARGE

## 2020-01-11 RX ORDER — LISINOPRIL 2.5 MG/1
20 TABLET ORAL
Qty: 0 | Refills: 0 | DISCHARGE

## 2020-01-11 RX ORDER — ATROPINE SULFATE 1 %
2 DROPS OPHTHALMIC (EYE) EVERY 4 HOURS
Refills: 0 | Status: DISCONTINUED | OUTPATIENT
Start: 2020-01-11 | End: 2020-01-12

## 2020-01-11 RX ORDER — MORPHINE SULFATE 50 MG/1
2 CAPSULE, EXTENDED RELEASE ORAL
Refills: 0 | Status: DISCONTINUED | OUTPATIENT
Start: 2020-01-11 | End: 2020-01-12

## 2020-01-11 RX ORDER — MORPHINE SULFATE 50 MG/1
2 CAPSULE, EXTENDED RELEASE ORAL
Qty: 100 | Refills: 0 | Status: DISCONTINUED | OUTPATIENT
Start: 2020-01-11 | End: 2020-01-12

## 2020-01-11 RX ADMIN — MORPHINE SULFATE 2 MG/HR: 50 CAPSULE, EXTENDED RELEASE ORAL at 19:03

## 2020-01-11 RX ADMIN — Medication 1 MILLIGRAM(S): at 16:00

## 2020-01-11 RX ADMIN — MORPHINE SULFATE 2 MG/HR: 50 CAPSULE, EXTENDED RELEASE ORAL at 18:19

## 2020-01-11 RX ADMIN — MORPHINE SULFATE 2 MILLIGRAM(S): 50 CAPSULE, EXTENDED RELEASE ORAL at 17:56

## 2020-01-11 RX ADMIN — MORPHINE SULFATE 2 MILLIGRAM(S): 50 CAPSULE, EXTENDED RELEASE ORAL at 15:50

## 2020-01-11 RX ADMIN — MORPHINE SULFATE 2 MILLIGRAM(S): 50 CAPSULE, EXTENDED RELEASE ORAL at 16:57

## 2020-01-11 RX ADMIN — MORPHINE SULFATE 2 MG/HR: 50 CAPSULE, EXTENDED RELEASE ORAL at 19:21

## 2020-01-11 RX ADMIN — Medication 2 DROP(S): at 18:47

## 2020-01-11 RX ADMIN — MORPHINE SULFATE 2 MILLIGRAM(S): 50 CAPSULE, EXTENDED RELEASE ORAL at 16:11

## 2020-01-11 RX ADMIN — MORPHINE SULFATE 2 MG/HR: 50 CAPSULE, EXTENDED RELEASE ORAL at 19:15

## 2020-01-11 RX ADMIN — Medication 2 DROP(S): at 21:47

## 2020-01-11 NOTE — ED ADULT NURSE NOTE - NSIMPLEMENTINTERV_GEN_ALL_ED
Implemented All Fall with Harm Risk Interventions:  [place] to call system. Call bell, personal items and telephone within reach. Instruct patient to call for assistance. Room bathroom lighting operational. Non-slip footwear when patient is off stretcher. Physically safe environment: no spills, clutter or unnecessary equipment. Stretcher in lowest position, wheels locked, appropriate side rails in place. Provide visual cue, wrist band, yellow gown, etc. Monitor gait and stability. Monitor for mental status changes and reorient to person, place, and time. Review medications for side effects contributing to fall risk. Reinforce activity limits and safety measures with patient and family. Provide visual clues: red socks.

## 2020-01-11 NOTE — H&P ADULT - ASSESSMENT
89 y/o F with PMHx of anxiety, HTN, dementia, macular degeneration admitted with large right sided intracranial hemorrhage made comfort care only in the ED, for palliative admission.    ICH  - large right ICH  - end of life care  - family requested DNR/DNI, comfort measures only  - for inpatient hospice evaluation at [facility]  - palliative Dr. Plaza on board  - morphine/benzos PRN pain and distress  - started on morphine gtt  - prognosis is grave, family aware that patient is actively dying - goal is comfort  - discontinue all home medications - palliative meds only  - no lab draws, imaging, work up  - vitals daily to assess for distress - per family request, they want to monitor for distress    HTN  - discontinue cardiac meds  - comfort care    Anxiety  - discontinue home meds  - ativan PRN distress    Palliative Measures  - hospice has been called to initiate inpatient hospice at [facility]  - prognosis is grave - family is aware. whole family at bedside, all with the same goals for patient

## 2020-01-11 NOTE — ED ADULT NURSE NOTE - OBJECTIVE STATEMENT
pt arrives unresponsive oral secretions and emesis in airway snoring respirations involuntary movement to rt upper extremity paralysis on left extremities pt arrives unresponsive oral secretions and emesis in airway snoring respirations involuntary movement to rt upper extremity paralysis on left extremities.  as per family pt noted to be confused last night at 10pm was seen awake but was not assessed at 0300 this morning  then found sleeping / unresponsive today prior to arrival

## 2020-01-11 NOTE — H&P ADULT - HISTORY OF PRESENT ILLNESS
89 y/o F with PMHx of anxiety, HTN, dementia, macular degeneration presents to the ED after being found unresponsive in her bed. Patient lives with 6 family members and is always surrounded by family. She was noted to be confused last night at 10PM (16 hours prior to arrival to ED). At 3 AM she was seen pretending to read a book. She was found unresponsive this AM and was found drooling bile colored liquid. She also lost control of her bowels at this time. She is not taking any blood thinners - not even aspirin. Family at bedside requesting comfort measures only. They state that she her blood pressure has been controlled and they have been weaning her off medications. They were concerned for aspiration. Family expresses patient has been having worsening dementia - very aware of her decline and frustrated by this. Patient is unresponsive and cannot give any review of systems. 2 of the family members at bedside are RNs.     In the ED, patient with elevated BP. Labs unremarkable. CT head with: Large acute parenchymal hemorrhage in the right frontal, parietal, and temporal lobes measuring 8.0 x 3.3 x 5.6 cm. There is surrounding vasogenic edema. There is right to left midline shift measuring 1 cm. There is right uncal herniation. There is dilatation of the left lateral ventricle suggesting entrapment. CXR: no acute change from prior study.

## 2020-01-11 NOTE — CONSULT NOTE ADULT - PROBLEM SELECTOR RECOMMENDATION 9
end of life care - palliative measures - goals of care discussed with family, HCP  pt is DNR DNI  comfort measures only  oral and skin care  atropine SL   wean off VM and on to NC o2 support  Morphine gtt and PRN IVP with Benzo - Aitvan made available PRN pushes  dulcolax - bowel regimen  no rrt, no mews, comfort care only, vitals daily  discussed above with HCP and family and ER MD  will follow  prognosis is very poor

## 2020-01-11 NOTE — ED PROVIDER NOTE - PROGRESS NOTE DETAILS
Amina Almaguer for attending Dr. Lancaster: Family requesting DNR/DNI at this time. Comfort care only. Dr. Plaza, Palliative Care, consulted and will see patient soon. Amina Almaguer for attending Dr. Lancaster: Family requesting DNR/DNI at this time. Comfort care only. Dr. Plaza (Palliative Care) consulted and will see patient soon.

## 2020-01-11 NOTE — ED PROVIDER NOTE - OBJECTIVE STATEMENT
89 y/o female with a PMHx of Anxiety, Hypertension, dementia, Macular degeneration, presents to the ED c/o unresponsiveness. Family noticed that Pt seemed confused at 10pm last night (16 hours PTA). Last seen at 3am today (11 hours PTA). Pt was reading a book at this time. Found unresponsive this morning. As per EMS, was drooling upon arrival (bile-colored). As per EMS, not currently taking blood thinners. Takes zoloft, nifedipine, atenolol, focus (for eyes), vitamin D3, and vitamin B12.  NKDA. No other complaints at this time.     Daughter is health care proxy; states that she would want DNR if unresponsive and had stroke.

## 2020-01-11 NOTE — ED PROVIDER NOTE - CLINICAL SUMMARY MEDICAL DECISION MAKING FREE TEXT BOX
Pt BIBEMS for altered mental status and left hemiparesis. Last known well 10 pm last night. Plan: EKG, XR, CT scan, and labs.

## 2020-01-11 NOTE — ED ADULT TRIAGE NOTE - CHIEF COMPLAINT QUOTE
according to dtr- pt sleeping late so when checked her @ 12N she was unresponsive with left sided weakness.

## 2020-01-11 NOTE — CONSULT NOTE ADULT - SUBJECTIVE AND OBJECTIVE BOX
Date/Time Patient Seen:  		  Referring MD:   Data Reviewed	       Patient is a 89y old  Female who presents with a chief complaint of     Subjective/HPI  in bed  seen and examined  vs and meds reviewed  labs reviewed  er provider note reviewed  met with family - 4/6 children  pt with underlying dementia    · HPI Objective Statement: 89 y/o female with a PMHx of Anxiety, Hypertension, dementia, Macular degeneration, presents to the ED c/o unresponsiveness. Family noticed that Pt seemed confused at 10pm last night (16 hours PTA). Last seen at 3am today (11 hours PTA). Pt was reading a book at this time. Found unresponsive this morning. As per EMS, was drooling upon arrival (bile-colored). As per EMS, not currently taking blood thinners. Takes zoloft, nifedipine, atenolol, focus (for eyes), vitamin D3, and vitamin B12.  NKDA. No other complaints at this time.     	Daughter is health care proxy; states that she would want DNR if unresponsive and had stroke.    PAST MEDICAL/SURGICAL/FAMILY/SOCIAL HISTORY:    Past Medical History:  Anxiety    Dementia    Hypertension    Macular degeneration.     Past Surgical History:  No significant past surgical history.    · Social Concerns: None     Tobacco Usage:  · Tobacco Usage	Unknown if ever smoked       Hospital Course:  Discharge Date	28-Jun-2019  Admission Date	24-Jun-2019 01:38  Reason for Admission	left hip pain  Medication Reconciliation Status	Admission Reconciliation is Completed  Discharge Reconciliation is Completed  Hospital Course	  89 F with PMhc of depression, HTN, HLD who was BIBEMS on 6/24/19 s/p unwitnessed fall at home. In ED patient was found to have Left hip fracture, patient was admitted to surgical intervention. Patient was medically cleared, Left hip hemiarthroplasty performed on 6/25/19 by Dr. Crews. Patient was agitated and confused preop op neurologist evaluated the patient no contraindication for surgery found. No operative or izzy-operative complications arose during patients hospital course.  Patient received antibiotic according to SCIP guidelines for infection prevention. Lovenox was given for DVT prophylaxis.  Anesthesia, Medical Hospitalist, Physical Therapy and Occupational Therapy were consulted. Patient is stable for discharge with a good prognosis.  Appropriate discharge instructions and medications are provided in this document.     PAST MEDICAL & SURGICAL HISTORY:  Dementia  Macular degeneration  Anxiety  Hypertension  No significant past surgical history        Medication list         MEDICATIONS  (STANDING):  atropine 1% Ophthalmic Solution for SubLingual Use 2 Drop(s) SubLingual every 4 hours  morphine  Infusion 2 mG/Hr (2 mL/Hr) IV Continuous <Continuous>    MEDICATIONS  (PRN):  bisacodyl 5 milliGRAM(s) Oral every 12 hours PRN Constipation  LORazepam   Injectable 1 milliGRAM(s) IV Push every 30 minutes PRN dyspnea, distress, suffering, anxiety  morphine  - Injectable 2 milliGRAM(s) IV Push every 1 hour PRN pain, distress, suffering, dyspnea,         Vitals log        ICU Vital Signs Last 24 Hrs  T(C): 37 (11 Jan 2020 14:38), Max: 37 (11 Jan 2020 14:38)  T(F): 98.6 (11 Jan 2020 14:38), Max: 98.6 (11 Jan 2020 14:38)  HR: 80 (11 Jan 2020 15:30) (76 - 89)  BP: 184/104 (11 Jan 2020 15:30) (184/104 - 205/88)  BP(mean): --  ABP: --  ABP(mean): --  RR: 14 (11 Jan 2020 15:30) (14 - 20)  SpO2: 98% (11 Jan 2020 15:30) (98% - 100%)           Input and Output:  I&O's Detail      Lab Data                        12.8   9.50  )-----------( 306      ( 11 Jan 2020 14:37 )             38.4     01-11    138  |  102  |  18  ----------------------------<  180<H>  3.4<L>   |  27  |  0.67    Ca    9.1      11 Jan 2020 14:37    TPro  7.8  /  Alb  3.5  /  TBili  0.4  /  DBili  x   /  AST  25  /  ALT  15  /  AlkPhos  88  01-11            Review of Systems	  non verbal    Objective     Physical Examination    heart s1s2  lung dc BS  abd soft  head nc  on v mask  family at BS      Pertinent Lab findings & Imaging      Pierce:  NO   Adequate UO     I&O's Detail           Discussed with:     Cultures:	        Radiology          EXAM:  CT BRAIN STROKE PROTOCOL                                  PROCEDURE DATE:  01/11/2020          INTERPRETATION:  CLINICAL INFORMATION: Stroke Code. . .    TECHNIQUE: Sequential axial images were obtained from the vertex to the skull base without intravenous contrast. Coronal and sagittal reformations were obtained.     COMPARISON: Prior CT dated 6/25/2019.     FINDINGS:    Large acute parenchymal hemorrhage in the right frontal, parietal, and temporal lobes measuring 8.0 x 3.3 x 5.6 cm. There is surrounding vasogenic edema. There is right to left midline shift measuring 1 cm. There is right uncal herniation. There is dilatation of the left lateral ventricle suggesting entrapment. There are areas of hypodensity in the bilateral hemispheric white matter suggesting chronic white matter microvascular ischemic change.     There is no extraaxial fluid collection.     There is no displaced calvarial fracture. Status post bilateral intraocular lens implants. The visualized portions of the paranasal sinuses are well aerated. The mastoid air cells are well aerated.      IMPRESSION: Large acute parenchymal hemorrhage in the right frontal, parietal, and temporal lobes measuring 8.0 x 3.3 x 5.6 cm. There is surrounding vasogenic edema. There is right to left midline shift measuring 1 cm. There is right uncal herniation. There is dilatation of the left lateral ventricle suggesting entrapment.    These findings were discussed with Dr. JERRY PERSON [phone] at 1/11/2020 2:58 PM by Dr. Tristin Israel with read back confirmation.                  TRISTIN ISRAEL   This document has been electronically signed. Jan 11 2020  2:59PM

## 2020-01-11 NOTE — H&P ADULT - NSHPPHYSICALEXAM_GEN_ALL_CORE
T(C): 37 (01-11-20 @ 14:38), Max: 37 (01-11-20 @ 14:38)  HR: 85 (01-11-20 @ 16:30) (76 - 89)  BP: 177/81 (01-11-20 @ 16:30) (177/81 - 205/88)  RR: 14 (01-11-20 @ 16:30) (14 - 20)  SpO2: 100% (01-11-20 @ 16:30) (98% - 100%)    GENERAL: ill appearing female  EYES: pupils minimally reactive, right eye slightly more dilated  ENMT: oropharynx clear without erythema, no exudates, moist mucous membranes  NECK: supple, soft, no thyromegaly noted  LUNGS: decreased breath sounds, poor inspiratory effort  HEART: soft S1/S2, regular rate and rhythm, no murmurs noted, no lower extremity edema  GASTROINTESTINAL: abdomen is soft, nondistended, normoactive bowel sounds, no palpable masses  INTEGUMENT: warm, well perfused  MUSCULOSKELETAL: no clubbing or cyanosis, no obvious deformity  NEUROLOGIC: unresponsive, left hemiparesis, right side with withdrawal to pain, involuntary tremors of right lower extremity  PSYCHIATRIC: unable to assess mood  HEME/LYMPH: no palpable supraclavicular nodules, no obvious ecchymosis or petechiae

## 2020-01-11 NOTE — ED PROVIDER NOTE - NEUROLOGICAL, MLM
Left hemiparesis. Withdraws to pain, full stimuli. Right upper and lower extremities.  Nonresponsive Left hemiparesis. Withdraws to pain, full stimuli. Right upper and lower extremities

## 2020-01-11 NOTE — ED ADULT NURSE REASSESSMENT NOTE - NS ED NURSE REASSESS COMMENT FT1
as per palliative md rosa  pt medicated with morphine for pain and suffering  pt has invol movement and tremoring of rt upper extremity with labored breathing  pt medicated as per md meza

## 2020-01-12 PROCEDURE — 99291 CRITICAL CARE FIRST HOUR: CPT | Mod: 25

## 2020-01-12 PROCEDURE — 85730 THROMBOPLASTIN TIME PARTIAL: CPT

## 2020-01-12 PROCEDURE — 71045 X-RAY EXAM CHEST 1 VIEW: CPT

## 2020-01-12 PROCEDURE — 80053 COMPREHEN METABOLIC PANEL: CPT

## 2020-01-12 PROCEDURE — 36415 COLL VENOUS BLD VENIPUNCTURE: CPT

## 2020-01-12 PROCEDURE — 85027 COMPLETE CBC AUTOMATED: CPT

## 2020-01-12 PROCEDURE — 85610 PROTHROMBIN TIME: CPT

## 2020-01-12 PROCEDURE — 93005 ELECTROCARDIOGRAM TRACING: CPT

## 2020-01-12 PROCEDURE — 70450 CT HEAD/BRAIN W/O DYE: CPT

## 2020-01-12 RX ADMIN — Medication 2 DROP(S): at 02:09

## 2020-01-12 RX ADMIN — MORPHINE SULFATE 2 MILLIGRAM(S): 50 CAPSULE, EXTENDED RELEASE ORAL at 03:48

## 2020-01-12 RX ADMIN — MORPHINE SULFATE 2 MILLIGRAM(S): 50 CAPSULE, EXTENDED RELEASE ORAL at 03:56

## 2020-01-12 NOTE — CHART NOTE - NSCHARTNOTEFT_GEN_A_CORE
Called by RN as patient stopped breathing, patient is DNR / DNI on comfort care.  Seen & examined at the bedside.   No spontaneous breathing, no heart beat or carotid pulse, pupils are unequal dilated & fixed bilaterally.  Pronounced dead at 4:12 am today January 12th, 2020. Family members (daughter & son in law) are at the bedside.

## 2020-01-12 NOTE — DISCHARGE NOTE FOR THE EXPIRED PATIENT - HOSPITAL COURSE
FROM ADMISSION H+P:   HPI:  89 y/o F with PMHx of anxiety, HTN, dementia, macular degeneration presents to the ED after being found unresponsive in her bed. Patient lives with 6 family members and is always surrounded by family. She was noted to be confused last night at 10PM (16 hours prior to arrival to ED). At 3 AM she was seen pretending to read a book. She was found unresponsive this AM and was found drooling bile colored liquid. She also lost control of her bowels at this time. She is not taking any blood thinners - not even aspirin. Family at bedside requesting comfort measures only. They state that she her blood pressure has been controlled and they have been weaning her off medications. They were concerned for aspiration. Family expresses patient has been having worsening dementia - very aware of her decline and frustrated by this. Patient is unresponsive and cannot give any review of systems. 2 of the family members at bedside are RNs.     In the ED, patient with elevated BP. Labs unremarkable. CT head with: Large acute parenchymal hemorrhage in the right frontal, parietal, and temporal lobes measuring 8.0 x 3.3 x 5.6 cm. There is surrounding vasogenic edema. There is right to left midline shift measuring 1 cm. There is right uncal herniation. There is dilatation of the left lateral ventricle suggesting entrapment. CXR: no acute change from prior study. (11 Jan 2020 17:04)      ---  HOSPITAL COURSE: Patient was initiated on comfort care measures in the ED, started on morphine gtt. Prognosis was grave and goal was to keep patient comfortable. Patient pronounced at 4:12AM on 1/12/2020 by Dr. Velasco, family at bedside.    ---  CONSULTANTS:   Palliative Dr. Plaza  ---    ---

## 2020-01-17 ENCOUNTER — APPOINTMENT (OUTPATIENT)
Dept: FAMILY MEDICINE | Facility: CLINIC | Age: 85
End: 2020-01-17

## 2023-01-09 NOTE — ED PROVIDER NOTE - GASTROINTESTINAL, MLM
Sent to [facility].  Please cancel script at [facility].  Brandon Denise   Abdomen soft, non-tender, no guarding.

## 2023-06-16 NOTE — ED PROVIDER NOTE - TRANSFER CONSULTATION #1
[FreeTextEntry1] : MRI scans of the cervical and lumbar spine at [facility]: She has lumbar scoliosis. Central left posterolateral L1-2 disc protrusion / ridge, left paracentral L2-3 ridge, post-op changes at L3-S1. Multilevel foraminal disc extension/narrowing. No significant spinal canal stenosis. The right posterolateral L3-4 disc protrusion is diminished consistent with improvement. As for her cervical spine,  there is a central disc herniation at C3-4 with mild to moderate cord impingement, left greater than right. Diffuse spondylotic ridge at C4-5 with moderate bilateral foraminal compromise. Diffuse spondylotic ridge at C5-6. Diffuse spondylotic ridge at C6-7 with mild left-sided cord impingement. Moderate to marked left foraminal compromise at C7-T1.\par \par  X-ray Lumbar spine from 9/2018 shows left lumbar curvature with multilevel disc degeneration and mild anterior slip L5 relative to S1. 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
will see patient in ED

## 2023-10-09 NOTE — PATIENT PROFILE ADULT - NSTRANSFERBELONGINGSDISPO_GEN_A_NUR
What Is The Reason For Today's Visit?: Full Body Skin Examination given to family with patient/given to family

## 2025-04-11 NOTE — ED ADULT NURSE NOTE - GLASGOW COMA SCALE: BEST MOTOR RESPONSE
Patient: Crystal Hall    Procedure Summary       Date: 04/11/25 Room / Location: [facility] OR 04 / Virtual [facility] OR    Anesthesia Start: 1005 Anesthesia Stop:     Procedure: BILATERAL BLEPHAROPLASTY (Bilateral: Eye) Diagnosis:       Dermatochalasis of both upper eyelids      Brow ptosis      (Dermatochalasis of both upper eyelids [H02.831, H02.834])      (Brow ptosis [H57.819])    Surgeons: Ethel Benavides MD Responsible Provider: Cady Lopez MD    Anesthesia Type: MAC ASA Status: 2            Anesthesia Type: MAC    Vitals Value Taken Time   /80 04/11/25 1153   Temp 36 04/11/25 1153   Pulse 78 04/11/25 1153   Resp 18 04/11/25 1153   SpO2 100 % 04/11/25 1152   Vitals shown include unfiled device data.    Anesthesia Post Evaluation    Patient location during evaluation: PACU  Patient participation: complete - patient participated  Level of consciousness: awake and alert  Pain score: 0  Pain management: adequate  Airway patency: patent  Cardiovascular status: acceptable and hemodynamically stable  Respiratory status: acceptable, nasal cannula, nonlabored ventilation and spontaneous ventilation  Hydration status: acceptable  Postoperative Nausea and Vomiting: none        No notable events documented.     (M1) none